# Patient Record
Sex: FEMALE | Race: WHITE | Employment: OTHER | URBAN - METROPOLITAN AREA
[De-identification: names, ages, dates, MRNs, and addresses within clinical notes are randomized per-mention and may not be internally consistent; named-entity substitution may affect disease eponyms.]

---

## 2017-03-21 RX ORDER — HYDROCHLOROTHIAZIDE 25 MG/1
TABLET ORAL
Qty: 90 TAB | Refills: 1 | Status: SHIPPED | OUTPATIENT
Start: 2017-03-21 | End: 2017-07-19 | Stop reason: SDUPTHER

## 2017-07-17 ENCOUNTER — OFFICE VISIT (OUTPATIENT)
Dept: INTERNAL MEDICINE CLINIC | Age: 67
End: 2017-07-17

## 2017-07-17 VITALS
OXYGEN SATURATION: 99 % | WEIGHT: 187.6 LBS | TEMPERATURE: 97.6 F | BODY MASS INDEX: 31.25 KG/M2 | DIASTOLIC BLOOD PRESSURE: 85 MMHG | RESPIRATION RATE: 16 BRPM | SYSTOLIC BLOOD PRESSURE: 135 MMHG | HEART RATE: 78 BPM | HEIGHT: 65 IN

## 2017-07-17 DIAGNOSIS — E03.9 ACQUIRED HYPOTHYROIDISM: ICD-10-CM

## 2017-07-17 DIAGNOSIS — M17.12 PRIMARY OSTEOARTHRITIS OF LEFT KNEE: ICD-10-CM

## 2017-07-17 DIAGNOSIS — Z12.11 SCREEN FOR COLON CANCER: ICD-10-CM

## 2017-07-17 DIAGNOSIS — N32.81 OAB (OVERACTIVE BLADDER): ICD-10-CM

## 2017-07-17 DIAGNOSIS — Z00.00 ROUTINE GENERAL MEDICAL EXAMINATION AT A HEALTH CARE FACILITY: ICD-10-CM

## 2017-07-17 DIAGNOSIS — Z71.89 ADVANCE DIRECTIVE DISCUSSED WITH PATIENT: ICD-10-CM

## 2017-07-17 DIAGNOSIS — M54.31 RIGHT SIDED SCIATICA: ICD-10-CM

## 2017-07-17 DIAGNOSIS — Z13.39 SCREENING FOR ALCOHOLISM: ICD-10-CM

## 2017-07-17 DIAGNOSIS — Z78.0 POSTMENOPAUSAL: ICD-10-CM

## 2017-07-17 DIAGNOSIS — I10 ESSENTIAL HYPERTENSION: Primary | ICD-10-CM

## 2017-07-17 RX ORDER — DICLOFENAC SODIUM 75 MG/1
75 TABLET, DELAYED RELEASE ORAL 2 TIMES DAILY
Qty: 30 TAB | Refills: 1 | Status: SHIPPED | OUTPATIENT
Start: 2017-07-17 | End: 2018-01-08 | Stop reason: ALTCHOICE

## 2017-07-17 NOTE — PATIENT INSTRUCTIONS
Low Back Pain: Exercises  Your Care Instructions  Here are some examples of typical rehabilitation exercises for your condition. Start each exercise slowly. Ease off the exercise if you start to have pain. Your doctor or physical therapist will tell you when you can start these exercises and which ones will work best for you. How to do the exercises  Press-up    1. Lie on your stomach, supporting your body with your forearms. 2. Press your elbows down into the floor to raise your upper back. As you do this, relax your stomach muscles and allow your back to arch without using your back muscles. As your press up, do not let your hips or pelvis come off the floor. 3. Hold for 15 to 30 seconds, then relax. 4. Repeat 2 to 4 times. Alternate arm and leg (bird dog) exercise    Note: Do this exercise slowly. Try to keep your body straight at all times, and do not let one hip drop lower than the other. 1. Start on the floor, on your hands and knees. 2. Tighten your belly muscles. 3. Raise one leg off the floor, and hold it straight out behind you. Be careful not to let your hip drop down, because that will twist your trunk. 4. Hold for about 6 seconds, then lower your leg and switch to the other leg. 5. Repeat 8 to 12 times on each leg. 6. Over time, work up to holding for 10 to 30 seconds each time. 7. If you feel stable and secure with your leg raised, try raising the opposite arm straight out in front of you at the same time. Knee-to-chest exercise    1. Lie on your back with your knees bent and your feet flat on the floor. 2. Bring one knee to your chest, keeping the other foot flat on the floor (or keeping the other leg straight, whichever feels better on your lower back). 3. Keep your lower back pressed to the floor. Hold for at least 15 to 30 seconds. 4. Relax, and lower the knee to the starting position. 5. Repeat with the other leg. Repeat 2 to 4 times with each leg.   6. To get more stretch, put your other leg flat on the floor while pulling your knee to your chest.  Curl-ups    1. Lie on the floor on your back with your knees bent at a 90-degree angle. Your feet should be flat on the floor, about 12 inches from your buttocks. 2. Cross your arms over your chest. If this bothers your neck, try putting your hands behind your neck (not your head), with your elbows spread apart. 3. Slowly tighten your belly muscles and raise your shoulder blades off the floor. 4. Keep your head in line with your body, and do not press your chin to your chest.  5. Hold this position for 1 or 2 seconds, then slowly lower yourself back down to the floor. 6. Repeat 8 to 12 times. Pelvic tilt exercise    1. Lie on your back with your knees bent. 2. \"Brace\" your stomach. This means to tighten your muscles by pulling in and imagining your belly button moving toward your spine. You should feel like your back is pressing to the floor and your hips and pelvis are rocking back. 3. Hold for about 6 seconds while you breathe smoothly. 4. Repeat 8 to 12 times. Heel dig bridging    1. Lie on your back with both knees bent and your ankles bent so that only your heels are digging into the floor. Your knees should be bent about 90 degrees. 2. Then push your heels into the floor, squeeze your buttocks, and lift your hips off the floor until your shoulders, hips, and knees are all in a straight line. 3. Hold for about 6 seconds as you continue to breathe normally, and then slowly lower your hips back down to the floor and rest for up to 10 seconds. 4. Do 8 to 12 repetitions. Hamstring stretch in doorway    1. Lie on your back in a doorway, with one leg through the open door. 2. Slide your leg up the wall to straighten your knee. You should feel a gentle stretch down the back of your leg. 3. Hold the stretch for at least 15 to 30 seconds. Do not arch your back, point your toes, or bend either knee.  Keep one heel touching the floor and the other heel touching the wall. 4. Repeat with your other leg. 5. Do 2 to 4 times for each leg. Hip flexor stretch    1. Kneel on the floor with one knee bent and one leg behind you. Place your forward knee over your foot. Keep your other knee touching the floor. 2. Slowly push your hips forward until you feel a stretch in the upper thigh of your rear leg. 3. Hold the stretch for at least 15 to 30 seconds. Repeat with your other leg. 4. Do 2 to 4 times on each side. Wall sit    1. Stand with your back 10 to 12 inches away from a wall. 2. Lean into the wall until your back is flat against it. 3. Slowly slide down until your knees are slightly bent, pressing your lower back into the wall. 4. Hold for about 6 seconds, then slide back up the wall. 5. Repeat 8 to 12 times. Follow-up care is a key part of your treatment and safety. Be sure to make and go to all appointments, and call your doctor if you are having problems. It's also a good idea to know your test results and keep a list of the medicines you take. Where can you learn more? Go to http://[employer]ztof.info/. Enter Q768 in the search box to learn more about \"Low Back Pain: Exercises. \"  Current as of: March 21, 2017  Content Version: 11.3  © 2618-9760 [employer]. Care instructions adapted under license by [employer] (which disclaims liability or warranty for this information). If you have questions about a medical condition or this instruction, always ask your healthcare professional. [place] [address] any warranty or liability for your use of this information. Knee Arthritis: Exercises  Your Care Instructions  Here are some examples of exercises for knee arthritis. Start each exercise slowly. Ease off the exercise if you start to have pain. Your doctor or physical therapist will tell you when you can start these exercises and which ones will work best for you.   How to do the exercises  Knee flexion with heel slide    5. Lie on your back with your knees bent. 6. Slide your heel back by bending your affected knee as far as you can. Then hook your other foot around your ankle to help pull your heel even farther back. 7. Hold for about 6 seconds, then rest for up to 10 seconds. 8. Repeat 8 to 12 times. 9. Switch legs and repeat steps 1 through 4, even if only one knee is sore. Quad sets    8. Sit with your affected leg straight and supported on the floor or a firm bed. Place a small, rolled-up towel under your knee. Your other leg should be bent, with that foot flat on the floor. 9. Tighten the thigh muscles of your affected leg by pressing the back of your knee down into the towel. 10. Hold for about 6 seconds, then rest for up to 10 seconds. 11. Repeat 8 to 12 times. 12. Switch legs and repeat steps 1 through 4, even if only one knee is sore. Straight-leg raises to the front    7. Lie on your back with your good knee bent so that your foot rests flat on the floor. Your affected leg should be straight. Make sure that your low back has a normal curve. You should be able to slip your hand in between the floor and the small of your back, with your palm touching the floor and your back touching the back of your hand. 8. Tighten the thigh muscles in your affected leg by pressing the back of your knee flat down to the floor. Hold your knee straight. 9. Keeping the thigh muscles tight and your leg straight, lift your affected leg up so that your heel is about 12 inches off the floor. Hold for about 6 seconds, then lower slowly. 10. Relax for up to 10 seconds between repetitions. 11. Repeat 8 to 12 times. 12. Switch legs and repeat steps 1 through 5, even if only one knee is sore. Active knee flexion    7. Lie on your stomach with your knees straight. If your kneecap is uncomfortable, roll up a washcloth and put it under your leg just above your kneecap.   8. Lift the foot of your affected leg by bending the knee so that you bring the foot up toward your buttock. If this motion hurts, try it without bending your knee quite as far. This may help you avoid any painful motion. 9. Slowly move your leg up and down. 10. Repeat 8 to 12 times. 11. Switch legs and repeat steps 1 through 4, even if only one knee is sore. Quadriceps stretch (facedown)    5. Lie flat on your stomach, and rest your face on the floor. 6. Wrap a towel or belt strap around the lower part of your affected leg. Then use the towel or belt strap to slowly pull your heel toward your buttock until you feel a stretch. 7. Hold for about 15 to 30 seconds, then relax your leg against the towel or belt strap. 8. Repeat 2 to 4 times. 9. Switch legs and repeat steps 1 through 4, even if only one knee is sore. Stationary exercise bike    If you do not have a stationary exercise bike at home, you can find one to ride at your local health club or community center. 5. Adjust the height of the bike seat so that your knee is slightly bent when your leg is extended downward. If your knee hurts when the pedal reaches the top, you can raise the seat so that your knee does not bend as much. 6. Start slowly. At first, try to do 5 to 10 minutes of cycling with little to no resistance. Then increase your time and the resistance bit by bit until you can do 20 to 30 minutes without pain. 7. If you start to have pain, rest your knee until your pain gets back to the level that is normal for you. Or cycle for less time or with less effort. Follow-up care is a key part of your treatment and safety. Be sure to make and go to all appointments, and call your doctor if you are having problems. It's also a good idea to know your test results and keep a list of the medicines you take. Where can you learn more? Go to http://deyan[employer]-krzysztof.info/.   Enter C159 in the search box to learn more about \"Knee Arthritis: Exercises. \"  Current as of: March 21, 2017  Content Version: 11.3  © 8225-2551 [employer]. Care instructions adapted under license by [employer] (which disclaims liability or warranty for this information). If you have questions about a medical condition or this instruction, always ask your healthcare professional. [address] any warranty or liability for your use of this information. Medicare Wellness Visit, Female    The best way to live healthy is to have a healthy lifestyle by eating a well-balanced diet, exercising regularly, limiting alcohol and stopping smoking. Regular physical exams and screening tests are another way to keep healthy. Preventive exams provided by your health care provider can find health problems before they become diseases or illnesses. Preventive services including immunizations, screening tests, monitoring and exams can help you take care of your own health. All people over age 72 should have a pneumovax  and and a prevnar shot to prevent pneumonia. These are once in a lifetime unless you and your provider decide differently. All people over 65 should have a yearly flu shot and a tetanus vaccine every 10 years. A bone mass density to screen for osteoporosis or thinning of the bones should be done every 2 years after 65. Screening for diabetes mellitus with a blood sugar test should be done every year. Glaucoma is a disease of the eye due to increased ocular pressure that can lead to blindness and it should be done every year by an eye professional.    Cardiovascular screening tests that check for elevated lipids (fatty part of blood) which can lead to heart disease and strokes should be done every 5 years.     Colorectal screening that evaluates for blood or polyps in your colon should be done yearly as a stool test or every five years as a flexible sigmoidoscope or every 10 years as a colonoscopy up to age 76.    Breast cancer screening with a mammogram is recommended biennially  for women age 54-69. Screening for cervical cancer with a pap smear and pelvic exam is recommended for women after age 72 years every 2 years up to age 79 or when the provider and patient decide to stop. If there is a history of cervical abnormalities or other increased risk for cancer then the test is recommended yearly. Hepatitis C screening is also recommended for anyone born between [birth year] through [address]. A shingles vaccine is also recommended once in a lifetime after age 61. Your Medicare Wellness Exam is recommended annually.     Here is a list of your current Health Maintenance items with a due date:  Health Maintenance Due   Topic Date Due    Colonoscopy  Referral given - please schedule    DTaP/Tdap/Td  (1 - Tdap) Prescription given    Glaucoma Screening   Patient will schedule    Bone Density Screening  Ordered today    Annual Well Visit  07/17/2018    Pneumococcal Vaccine (2 of 2 - PCV13) Up to date    Breast Cancer Screening  08/16/2017

## 2017-07-17 NOTE — PROGRESS NOTES
Shira Ramos is a 75 y.o. female who presents today with discomfort in legs especially at night. She also complains of nausea and feeling \"not so great\" when walking around for example the grocery store. She mentions she has trouble with urination sometimes causing incontinence. Shira Ramos states she is having trouble with back and left knee as well. patient main concern is getting up and down on chairs she states it is exteremly hard to get up. 1. Have you been to the ER, urgent care clinic since your last visit? Hospitalized since your last visit? No    2. Have you seen or consulted any other health care providers outside of the [address] [place] [address] since your last visit? Include any pap smears or colon screening.  No

## 2017-07-17 NOTE — MR AVS SNAPSHOT
Visit Information Date & Time Provider Department Dept. Phone Encounter #  
 7/17/2017 12:40 PM Elena Anna MD [facility] [phone] [id number] Upcoming Health Maintenance Date Due COLONOSCOPY 5/4/1967 DTaP/Tdap/Td series (1 - Tdap) 5/4/1970 GLAUCOMA SCREENING Q2Y 5/4/2014 OSTEOPOROSIS SCREENING (DEXA) 5/4/2014 MEDICARE YEARLY EXAM 5/4/2014 Pneumococcal 65+ Low/Medium Risk (2 of 2 - PCV13) 11/12/2015 BREAST CANCER SCRN MAMMOGRAM 8/16/2017 INFLUENZA AGE 9 TO ADULT 8/1/2017 Allergies as of 7/17/2017  Review Complete On: 7/17/2017 By: Elena Anna MD  
  
 Severity Noted Reaction Type Reactions Latex  10/24/2011    Unknown (comments) Augmentin [Amoxicillin-pot Clavulanate]  01/07/2013    Diarrhea She can tolerate Amoxicillin Erythromycin  10/10/2011    Unknown (comments) Motrin [Ibuprofen]  10/10/2011    Swelling  
 legs Current Immunizations  Reviewed on 8/4/2015 Name Date Influenza Vaccine 11/12/2014, 10/10/2013 Influenza Vaccine Split 10/24/2011 11:52 AM  
 Pneumococcal Conjugate (PCV-13) 6/1/2016 Pneumococcal Polysaccharide (PPSV-23) 11/12/2014 Zoster Vaccine, Live 8/3/2015 Not reviewed this visit You Were Diagnosed With   
  
 Codes Comments Essential hypertension    -  Primary ICD-10-CM: I10 
ICD-9-CM: 401.9 Routine general medical examination at a health care facility     ICD-10-CM: Z00.00 ICD-9-CM: V70.0 Screening for alcoholism     ICD-10-CM: Z13.89 ICD-9-CM: V79.1 Postmenopausal     ICD-10-CM: Z78.0 ICD-9-CM: V49.81 Screen for colon cancer     ICD-10-CM: Z12.11 ICD-9-CM: V76.51   
 OAB (overactive bladder)     ICD-10-CM: N32.81 ICD-9-CM: 596.51 Acquired hypothyroidism     ICD-10-CM: E03.9 ICD-9-CM: 244.9 Primary osteoarthritis of left knee     ICD-10-CM: M17.12 
ICD-9-CM: 715.16 Right sided sciatica     ICD-10-CM: M54.31 
ICD-9-CM: 724.3 Vitals BP Pulse Temp Resp Height(growth percentile) Weight(growth percentile) 135/85 78 97.6 °F (36.4 °C) 16 5' 5\" (1.651 m) 187 lb 9.6 oz (85.1 kg) SpO2 BMI OB Status Smoking Status 99% 31.22 kg/m2 Postmenopausal Never Smoker Vitals History BMI and BSA Data Body Mass Index Body Surface Area  
 31.22 kg/m 2 1.98 m 2 Preferred Pharmacy Pharmacy Name Phone [address] [facility] [phone] Your Updated Medication List  
  
   
This list is accurate as of: 17  1:43 PM.  Always use your most recent med list.  
  
  
  
  
 diclofenac EC 75 mg EC tablet Commonly known as:  VOLTAREN Take 1 Tab by mouth two (2) times a day. diph,pertuss(acel),tetanus vac(PF) 2 Lf-(2.5-5-3-5 mcg)-5Lf/0.5 mL Syrg vaccine Commonly known as:  ADACEL  
0.5 mL by IntraMUSCular route once for 1 dose.  
  
 hydroCHLOROthiazide 25 mg tablet Commonly known as:  HYDRODIURIL  
TAKE 1 TABLET DAILY  
  
 SYNTHROID 100 mcg tablet Generic drug:  levothyroxine TAKE ONE TABLET DAILY BEFORE BREAKFAST AND ONE AND ONE-HALF TABLETS ON  Prescriptions Printed Refills diph,pertuss,acel,,tetanus vac,PF, (ADACEL) 2 Lf-(2.5-5-3-5 mcg)-5Lf/0.5 mL syrg vaccine 0 Si.5 mL by IntraMUSCular route once for 1 dose. Class: Print Route: IntraMUSCular Prescriptions Sent to Pharmacy Refills  
 diclofenac EC (VOLTAREN) 75 mg EC tablet 1 Sig: Take 1 Tab by mouth two (2) times a day. Class: Normal  
 Pharmacy: [address], [address] [place] [address] #: [phone] Route: Oral  
  
We Performed the Following METABOLIC PANEL, BASIC [54084 CPT(R)] REFERRAL FOR COLONOSCOPY [NTT151 Custom] T4, FREE [id number] CPT(R)] TSH 3RD GENERATION [15996 CPT(R)] To-Do List   
 2017 Imaging:  DEXA BONE DENSITY STUDY AXIAL Referral Information Referral ID Referred By Referred To  
  
 [id number] Cliff Ratliff MD   
   [address] [place], [address] [facility] Phone: [phone] Fax: [phone] Visits Status Start Date End Date 1 New Request 7/17/17 7/17/18 If your referral has a status of pending review or denied, additional information will be sent to support the outcome of this decision. Patient Instructions Low Back Pain: Exercises Your Care Instructions Here are some examples of typical rehabilitation exercises for your condition. Start each exercise slowly. Ease off the exercise if you start to have pain. Your doctor or physical therapist will tell you when you can start these exercises and which ones will work best for you. How to do the exercises Press-up 1. Lie on your stomach, supporting your body with your forearms. 2. Press your elbows down into the floor to raise your upper back. As you do this, relax your stomach muscles and allow your back to arch without using your back muscles. As your press up, do not let your hips or pelvis come off the floor. 3. Hold for 15 to 30 seconds, then relax. 4. Repeat 2 to 4 times. Alternate arm and leg (bird dog) exercise Note: Do this exercise slowly. Try to keep your body straight at all times, and do not let one hip drop lower than the other. 1. Start on the floor, on your hands and knees. 2. Tighten your belly muscles. 3. Raise one leg off the floor, and hold it straight out behind you. Be careful not to let your hip drop down, because that will twist your trunk. 4. Hold for about 6 seconds, then lower your leg and switch to the other leg. 5. Repeat 8 to 12 times on each leg. 6. Over time, work up to holding for 10 to 30 seconds each time. 7. If you feel stable and secure with your leg raised, try raising the opposite arm straight out in front of you at the same time. Knee-to-chest exercise 1. Lie on your back with your knees bent and your feet flat on the floor. 2. Bring one knee to your chest, keeping the other foot flat on the floor (or keeping the other leg straight, whichever feels better on your lower back). 3. Keep your lower back pressed to the floor. Hold for at least 15 to 30 seconds. 4. Relax, and lower the knee to the starting position. 5. Repeat with the other leg. Repeat 2 to 4 times with each leg. 6. To get more stretch, put your other leg flat on the floor while pulling your knee to your chest. 
Curl-ups 1. Lie on the floor on your back with your knees bent at a 90-degree angle. Your feet should be flat on the floor, about 12 inches from your buttocks. 2. Cross your arms over your chest. If this bothers your neck, try putting your hands behind your neck (not your head), with your elbows spread apart. 3. Slowly tighten your belly muscles and raise your shoulder blades off the floor. 4. Keep your head in line with your body, and do not press your chin to your chest. 
5. Hold this position for 1 or 2 seconds, then slowly lower yourself back down to the floor. 6. Repeat 8 to 12 times. Pelvic tilt exercise 1. Lie on your back with your knees bent. 2. \"Brace\" your stomach. This means to tighten your muscles by pulling in and imagining your belly button moving toward your spine. You should feel like your back is pressing to the floor and your hips and pelvis are rocking back. 3. Hold for about 6 seconds while you breathe smoothly. 4. Repeat 8 to 12 times. Heel dig bridging 1. Lie on your back with both knees bent and your ankles bent so that only your heels are digging into the floor. Your knees should be bent about 90 degrees. 2. Then push your heels into the floor, squeeze your buttocks, and lift your hips off the floor until your shoulders, hips, and knees are all in a straight line. 3. Hold for about 6 seconds as you continue to breathe normally, and then slowly lower your hips back down to the floor and rest for up to 10 seconds. 4. Do 8 to 12 repetitions. Hamstring stretch in doorway 1. Lie on your back in a doorway, with one leg through the open door. 2. Slide your leg up the wall to straighten your knee. You should feel a gentle stretch down the back of your leg. 3. Hold the stretch for at least 15 to 30 seconds. Do not arch your back, point your toes, or bend either knee. Keep one heel touching the floor and the other heel touching the wall. 4. Repeat with your other leg. 5. Do 2 to 4 times for each leg. Hip flexor stretch 1. Kneel on the floor with one knee bent and one leg behind you. Place your forward knee over your foot. Keep your other knee touching the floor. 2. Slowly push your hips forward until you feel a stretch in the upper thigh of your rear leg. 3. Hold the stretch for at least 15 to 30 seconds. Repeat with your other leg. 4. Do 2 to 4 times on each side. Wall sit 1. Stand with your back 10 to 12 inches away from a wall. 2. Lean into the wall until your back is flat against it. 3. Slowly slide down until your knees are slightly bent, pressing your lower back into the wall. 4. Hold for about 6 seconds, then slide back up the wall. 5. Repeat 8 to 12 times. Follow-up care is a key part of your treatment and safety. Be sure to make and go to all appointments, and call your doctor if you are having problems. It's also a good idea to know your test results and keep a list of the medicines you take. Where can you learn more? Go to http://deyanira-krzysztof.info/. Enter Y355 in the search box to learn more about \"Low Back Pain: Exercises. \" Current as of: March 21, 2017 Content Version: 11.3 © 6285-0355 [employer].  Care instructions adapted under license by [employer] (which disclaims liability or warranty for this information). If you have questions about a medical condition or this instruction, always ask your healthcare professional. [address] any warranty or liability for your use of this information. Knee Arthritis: Exercises Your Care Instructions Here are some examples of exercises for knee arthritis. Start each exercise slowly. Ease off the exercise if you start to have pain. Your doctor or physical therapist will tell you when you can start these exercises and which ones will work best for you. How to do the exercises Knee flexion with heel slide 5. Lie on your back with your knees bent. 6. Slide your heel back by bending your affected knee as far as you can. Then hook your other foot around your ankle to help pull your heel even farther back. 7. Hold for about 6 seconds, then rest for up to 10 seconds. 8. Repeat 8 to 12 times. 9. Switch legs and repeat steps 1 through 4, even if only one knee is sore. [facility] 8. Sit with your affected leg straight and supported on the floor or a firm bed. Place a small, rolled-up towel under your knee. Your other leg should be bent, with that foot flat on the floor. 9. Tighten the thigh muscles of your affected leg by pressing the back of your knee down into the towel. 10. Hold for about 6 seconds, then rest for up to 10 seconds. 11. Repeat 8 to 12 times. 12. Switch legs and repeat steps 1 through 4, even if only one knee is sore. Straight-leg raises to the front 7. Lie on your back with your good knee bent so that your foot rests flat on the floor. Your affected leg should be straight. Make sure that your low back has a normal curve. You should be able to slip your hand in between the floor and the small of your back, with your palm touching the floor and your back touching the back of your hand.  
8. Tighten the thigh muscles in your affected leg by pressing the back of your knee flat down to the floor. Hold your knee straight. 9. Keeping the thigh muscles tight and your leg straight, lift your affected leg up so that your heel is about 12 inches off the floor. Hold for about 6 seconds, then lower slowly. 10. Relax for up to 10 seconds between repetitions. 11. Repeat 8 to 12 times. 12. Switch legs and repeat steps 1 through 5, even if only one knee is sore. Active knee flexion 7. Lie on your stomach with your knees straight. If your kneecap is uncomfortable, roll up a washcloth and put it under your leg just above your kneecap. 8. Lift the foot of your affected leg by bending the knee so that you bring the foot up toward your buttock. If this motion hurts, try it without bending your knee quite as far. This may help you avoid any painful motion. 9. Slowly move your leg up and down. 10. Repeat 8 to 12 times. 11. Switch legs and repeat steps 1 through 4, even if only one knee is sore. Quadriceps stretch (facedown) 5. Lie flat on your stomach, and rest your face on the floor. 6. Wrap a towel or belt strap around the lower part of your affected leg. Then use the towel or belt strap to slowly pull your heel toward your buttock until you feel a stretch. 7. Hold for about 15 to 30 seconds, then relax your leg against the towel or belt strap. 8. Repeat 2 to 4 times. 9. Switch legs and repeat steps 1 through 4, even if only one knee is sore. Stationary exercise bike If you do not have a stationary exercise bike at home, you can find one to ride at your local health club or community center. 5. Adjust the height of the bike seat so that your knee is slightly bent when your leg is extended downward. If your knee hurts when the pedal reaches the top, you can raise the seat so that your knee does not bend as much. 6. Start slowly. At first, try to do 5 to 10 minutes of cycling with little to no resistance.  Then increase your time and the resistance bit by bit until you can do 20 to 30 minutes without pain. 7. If you start to have pain, rest your knee until your pain gets back to the level that is normal for you. Or cycle for less time or with less effort. Follow-up care is a key part of your treatment and safety. Be sure to make and go to all appointments, and call your doctor if you are having problems. It's also a good idea to know your test results and keep a list of the medicines you take. Where can you learn more? Go to http://deyanira-krzysztof.info/. Enter C159 in the search box to learn more about \"Knee Arthritis: Exercises. \" Current as of: March 21, 2017 Content Version: 11.3 © 7287-7495 [employer]. Care instructions adapted under license by [employer] (which disclaims liability or warranty for this information). If you have questions about a medical condition or this instruction, always ask your healthcare professional. [place] [address] any warranty or liability for your use of this information. Medicare Wellness Visit, Female The best way to live healthy is to have a healthy lifestyle by eating a well-balanced diet, exercising regularly, limiting alcohol and stopping smoking. Regular physical exams and screening tests are another way to keep healthy. Preventive exams provided by your health care provider can find health problems before they become diseases or illnesses. Preventive services including immunizations, screening tests, monitoring and exams can help you take care of your own health. All people over age 72 should have a pneumovax  and and a prevnar shot to prevent pneumonia. These are once in a lifetime unless you and your provider decide differently. All people over 65 should have a yearly flu shot and a tetanus vaccine every 10 years. A bone mass density to screen for osteoporosis or thinning of the bones should be done every 2 years after 65. Screening for diabetes mellitus with a blood sugar test should be done every year. Glaucoma is a disease of the eye due to increased ocular pressure that can lead to blindness and it should be done every year by an eye professional. 
 
Cardiovascular screening tests that check for elevated lipids (fatty part of blood) which can lead to heart disease and strokes should be done every 5 years. Colorectal screening that evaluates for blood or polyps in your colon should be done yearly as a stool test or every five years as a flexible sigmoidoscope or every 10 years as a colonoscopy up to age 76. Breast cancer screening with a mammogram is recommended biennially  for women age 54-69. Screening for cervical cancer with a pap smear and pelvic exam is recommended for women after age 72 years every 2 years up to age 79 or when the provider and patient decide to stop. If there is a history of cervical abnormalities or other increased risk for cancer then the test is recommended yearly. Hepatitis C screening is also recommended for anyone born between [birth year] through [address]. A shingles vaccine is also recommended once in a lifetime after age 61. Your Medicare Wellness Exam is recommended annually. Here is a list of your current Health Maintenance items with a due date: 
Health Maintenance Due Topic Date Due  
 Colonoscopy  Referral given - please schedule  DTaP/Tdap/Td  (1 - Tdap) Prescription given  Glaucoma Screening   Patient will schedule  Bone Density Screening  Ordered today [facility] Annual Well Visit  07/17/2018  Pneumococcal Vaccine (2 of 2 - PCV13) Up to date  Breast Cancer Screening  08/16/2017 Introducing [facility]! [employer] introduces GoPlaceIt patient portal. Now you can access parts of your medical record, email your doctor's office, and request medication refills online. 1. In your internet browser, go to [email or web address]. [email or web address][email or web address] 2. Click on the First Time User? Click Here link in the Sign In box. You will see the New Member Sign Up page. 3. Enter your Infochimps Access Code exactly as it appears below. You will not need to use this code after youve completed the sign-up process. If you do not sign up before the expiration date, you must request a new code. · Infochimps Access Code: [email or web address] Expires: 10/15/2017  1:27 PM 
 
4. Enter the last four digits of your Social Security Number (xxxx) and Date of Birth (mm/dd/yyyy) as indicated and click Submit. You will be taken to the next sign-up page. 5. Create a Infochimps ID. This will be your Infochimps login ID and cannot be changed, so think of one that is secure and easy to remember. 6. Create a Infochimps password. You can change your password at any time. 7. Enter your Password Reset Question and Answer. This can be used at a later time if you forget your password. 8. Enter your e-mail address. You will receive e-mail notification when new information is available in [address] 9. Click Sign Up. You can now view and download portions of your medical record. 10. Click the Download Summary menu link to download a portable copy of your medical information. If you have questions, please visit the Frequently Asked Questions section of the Infochimps website. Remember, Infochimps is NOT to be used for urgent needs. For medical emergencies, dial 911. Now available from your iPhone and Android! Please provide this summary of care documentation to your next provider. Your primary care clinician is listed as [facility]. If you have any questions after today's visit, please call [phone].

## 2017-07-17 NOTE — PROGRESS NOTES
Subjective:     Willistine Cowden is a 75 y.o. female who presents for follow up of hypertension and hypothyroidism. .    Diet and Lifestyle: not attempting to follow a low fat, low cholesterol diet, not attempting to follow a low sodium diet, sedentary  Home BP Monitoring: is not measured at home    Cardiovascular ROS: taking medications as instructed, no medication side effects noted, no TIA's, no chest pain on exertion, no dyspnea on exertion, noting swelling of ankles, no orthostatic dizziness or lightheadedness, no palpitations. New concerns:   Increasing difficulty getting out of chair over the last 6-12 months,. R TKR 2011. Left knee with pain, crunching and swelling. Not unstable. No recent falls. Having pain in right sciatic nerve. Worse at night but improved once walks around - dull ache right worse than left leg. No stretching, no exercise. Increased urination at night - .3 times and increased urgency during the day. Some nausea but blames on stress.  is out of work. Sleeping ok if legs are not causing pain. Hypothyroid - energy level is low. Denies changes in weight, skin or hair. Increase in constipation.    + POP - cannot wear mask. Has not looked into getting a dental appliance. Currently not treated. Current Outpatient Prescriptions   Medication Sig Dispense Refill    diclofenac EC (VOLTAREN) 75 mg EC tablet Take 1 Tab by mouth two (2) times a day. 30 Tab 1    diph,pertuss,acel,,tetanus vac,PF, (ADACEL) 2 Lf-(2.5-5-3-5 mcg)-5Lf/0.5 mL syrg vaccine 0.5 mL by IntraMUSCular route once for 1 dose.  0.5 mL 0    hydroCHLOROthiazide (HYDRODIURIL) 25 mg tablet TAKE 1 TABLET DAILY 90 Tab 1    SYNTHROID 100 mcg tablet TAKE ONE TABLET DAILY BEFORE BREAKFAST AND ONE AND ONE-HALF TABLETS ON SUNDAY 105 Tab 3        Lab Results   Component Value Date/Time    Cholesterol, total 202 06/06/2016 12:11 PM    HDL Cholesterol 58 06/06/2016 12:11 PM    LDL, calculated 117 06/06/2016 12:11 PM    Triglyceride 135 06/06/2016 12:11 PM     Lab Results   Component Value Date/Time    ALT (SGPT) 11 06/06/2016 12:11 PM    AST (SGOT) 14 06/06/2016 12:11 PM    Alk. phosphatase 84 06/06/2016 12:11 PM    Bilirubin, total 0.9 06/06/2016 12:11 PM    Albumin 4.3 06/06/2016 12:11 PM    Protein, total 7.1 06/06/2016 12:11 PM    PLATELET 075 73/73/5853 10:00 AM       Lab Results   Component Value Date/Time    GFR est non-AA 62 06/06/2016 12:11 PM    GFR est AA 72 06/06/2016 12:11 PM    Creatinine 0.95 06/06/2016 12:11 PM    BUN 20 06/06/2016 12:11 PM    Sodium 141 06/06/2016 12:11 PM    Potassium 4.1 06/06/2016 12:11 PM    Chloride 100 06/06/2016 12:11 PM    CO2 26 06/06/2016 12:11 PM     Lab Results   Component Value Date/Time    TSH 3.830 06/06/2016 12:11 PM    T4, Free 1.51 06/06/2016 12:11 PM        Review of Systems, additional:  Pertinent items are noted in HPI. Objective:   Visit Vitals    /85    Pulse 78    Temp 97.6 °F (36.4 °C)    Resp 16    Ht 5' 5\" (1.651 m)    Wt 187 lb 9.6 oz (85.1 kg)    SpO2 99%    BMI 31.22 kg/m2     Appearance: alert, well appearing, and in no distress and oriented to person, place, and time. General exam:   NECK: supple, no lad, no bruit, no tm  LUNGS: cta bilat  CV rrr, no m/g/r  ABD: soft, nt, nd, nabs  EXT: no c/c/e  BACK - tenderness bilat (right worse than left) paraspinal muscles, lspine nontender, buttocks nontender. SLR + on right. Left knee - + crepitance and decreased rom. No swelling. ..     Assessment/Plan:     hypertension well controlled. current treatment plan is effective, no change in therapy.    Check labs    Hypothyroid - clinically euthyroid  Check labs  Cont same    Low back pain with sicatica - stretching exercises  Nsailds  If no improvement consider PT    OAB - discussed lifestyle changes  May consider medications in the future    OA left knee - strengthening exercises  May need referral to ortho in juture    Orders Placed This Encounter    Dexa Bone Density Study Axial ([id number])    METABOLIC PANEL, BASIC    T4, FREE    TSH 3RD GENERATION    Referral for Colonoscopy (options for GI, Colon &  Rectal Surgery, & General Surgery)    diclofenac EC (VOLTAREN) 75 mg EC tablet    diph,pertuss,acel,,tetanus vac,PF, (ADACEL) 2 Lf-(2.5-5-3-5 mcg)-5Lf/0.5 mL syrg vaccine     Follow-up Disposition: Not on File       This is an Initial Medicare Annual Wellness Exam (AWV) (Performed 12 months after IPPE or effective date of Medicare Part B enrollment, Once in a lifetime)    I have reviewed the patient's medical history in detail and updated the computerized patient record. History     Past Medical History:   Diagnosis Date    Fibrocystic breast disease     Hypertension     Hypothyroid 2/1998    s/p I131 treatment - [place]    IBS (irritable bowel syndrome)     POP on CPAP     4mm    Pilonidal cyst 1968      Past Surgical History:   Procedure Laterality Date    BREAST SURGERY PROCEDURE UNLISTED  10/09    benign lumpectomy - left    HX APPENDECTOMY  1960    HX DILATION AND CURETTAGE  11/00    HX GYN  5240,2193    exp laparoscopy for endometriosis    HX KNEE REPLACEMENT  9/10/10    right    HX ORTHOPAEDIC      r knee arthroscopy    HX ORTHOPAEDIC  12/01    left thumb and forearm surg     Current Outpatient Prescriptions   Medication Sig Dispense Refill    diclofenac EC (VOLTAREN) 75 mg EC tablet Take 1 Tab by mouth two (2) times a day. 30 Tab 1    diph,pertuss,acel,,tetanus vac,PF, (ADACEL) 2 Lf-(2.5-5-3-5 mcg)-5Lf/0.5 mL syrg vaccine 0.5 mL by IntraMUSCular route once for 1 dose.  0.5 mL 0    hydroCHLOROthiazide (HYDRODIURIL) 25 mg tablet TAKE 1 TABLET DAILY 90 Tab 1    SYNTHROID 100 mcg tablet TAKE ONE TABLET DAILY BEFORE BREAKFAST AND ONE AND ONE-HALF TABLETS ON SUNDAY 105 Tab 3     Allergies   Allergen Reactions    Latex Unknown (comments)    Augmentin [Amoxicillin-Pot Clavulanate] Diarrhea     She can tolerate Amoxicillin  Erythromycin Unknown (comments)    Motrin [Ibuprofen] Swelling     legs     Family History   Problem Relation Age of Onset    Hypertension Mother     Heart Attack Mother 80    COPD Father     Dementia Father      alzeihmers    Other Father      PUD     Social History   Substance Use Topics    Smoking status: Never Smoker    Smokeless tobacco: Never Used    Alcohol use No     Patient Active Problem List   Diagnosis Code    IBS (irritable bowel syndrome) K58.9    Fibrocystic breast N60.19    HTN (hypertension) I10    Hypothyroid E03.9    Obesity (BMI 30.0-34. 9) E66.9    OAB (overactive bladder) N32.81         Depression Risk Factor Screening:     PHQ over the last two weeks 7/17/2017   Little interest or pleasure in doing things Not at all   Feeling down, depressed or hopeless Not at all   Total Score PHQ 2 0     Alcohol Risk Factor Screening: On any occasion during the past 3 months, have you had more than 3 drinks containing alcohol? No    Do you average more than 7 drinks per week? No      Functional Ability and Level of Safety:     Hearing Loss   mild    Activities of Daily Living   Self-care. Requires assistance with: no ADLs    Fall Risk   Fall Risk Assessment, last 12 mths 7/17/2017   Able to walk? Yes   Fall in past 12 months? No   Fall with injury? -   Number of falls in past 12 months -   Fall Risk Score -     Abuse Screen   Patient is not abused    Review of Systems   Pertinent items are noted in HPI. Physical Examination     No exam data present    Evaluation of Cognitive Function:  Mood/affect:  neutral  Appearance: age appropriate  Family member/caregiver input: none    See above    Patient Care Team:  Dorita Lo MD as PCP - General (Internal Medicine)    Advice/Referrals/Counseling   Education and counseling provided:  Advanced directive discussed with pt.   Written materials provided      Assessment/Plan   HM-   Colonoscopy  DXA  rx for tdap  Advanced directives discussed. Orders Placed This Encounter    Dexa Bone Density Study Axial ([id number])    METABOLIC PANEL, BASIC    T4, FREE    TSH 3RD GENERATION    Referral for Colonoscopy (options for GI, Colon &  Rectal Surgery, & General Surgery)    diclofenac EC (VOLTAREN) 75 mg EC tablet    diph,pertuss,acel,,tetanus vac,PF, (ADACEL) 2 Lf-(2.5-5-3-5 mcg)-5Lf/0.5 mL syrg vaccine     Follow-up Disposition: Not on File.

## 2017-07-18 PROBLEM — Z71.89 ADVANCE DIRECTIVE DISCUSSED WITH PATIENT: Status: ACTIVE | Noted: 2017-07-18

## 2017-07-25 LAB
BUN SERPL-MCNC: 23 MG/DL (ref 8–27)
BUN/CREAT SERPL: 21 (ref 12–28)
CALCIUM SERPL-MCNC: 10 MG/DL (ref 8.7–10.3)
CHLORIDE SERPL-SCNC: 99 MMOL/L (ref 96–106)
CO2 SERPL-SCNC: 25 MMOL/L (ref 18–29)
CREAT SERPL-MCNC: 1.08 MG/DL (ref 0.57–1)
GLUCOSE SERPL-MCNC: 84 MG/DL (ref 65–99)
INTERPRETATION: NORMAL
POTASSIUM SERPL-SCNC: 3.9 MMOL/L (ref 3.5–5.2)
SODIUM SERPL-SCNC: 139 MMOL/L (ref 134–144)
T4 FREE SERPL-MCNC: 1.63 NG/DL (ref 0.82–1.77)
TSH SERPL DL<=0.005 MIU/L-ACNC: 3.86 UIU/ML (ref 0.45–4.5)

## 2017-10-16 ENCOUNTER — TELEPHONE (OUTPATIENT)
Dept: INTERNAL MEDICINE CLINIC | Age: 67
End: 2017-10-16

## 2017-10-16 NOTE — TELEPHONE ENCOUNTER
[facility],and the doctors told her from the way she is walking that she probley has parkinsons desease. Requesting return call.

## 2017-10-16 NOTE — TELEPHONE ENCOUNTER
Discussed. Walking is difficult secondary to OA and sciatica. Tremor is worse. Referred to neurology. Also told by [facility] that she had a basal cell left lower eye lid - referred to opthalmology.

## 2017-10-16 NOTE — TELEPHONE ENCOUNTER
Pt wants to discuss [facility] notes with MD.Please give pt a call back at this number [phone]

## 2017-10-20 ENCOUNTER — DOCUMENTATION ONLY (OUTPATIENT)
Dept: INTERNAL MEDICINE CLINIC | Age: 67
End: 2017-10-20

## 2017-10-20 DIAGNOSIS — Z78.0 POSTMENOPAUSAL: ICD-10-CM

## 2018-01-08 ENCOUNTER — OFFICE VISIT (OUTPATIENT)
Dept: INTERNAL MEDICINE CLINIC | Age: 68
End: 2018-01-08

## 2018-01-08 VITALS
HEART RATE: 75 BPM | TEMPERATURE: 97.8 F | RESPIRATION RATE: 18 BRPM | DIASTOLIC BLOOD PRESSURE: 78 MMHG | HEIGHT: 65 IN | WEIGHT: 181.4 LBS | OXYGEN SATURATION: 98 % | SYSTOLIC BLOOD PRESSURE: 132 MMHG | BODY MASS INDEX: 30.22 KG/M2

## 2018-01-08 DIAGNOSIS — C44.319 BASAL CELL CARCINOMA OF SKIN OF OTHER PART OF FACE: Primary | ICD-10-CM

## 2018-01-08 DIAGNOSIS — Z01.818 PRE-OP EXAM: ICD-10-CM

## 2018-01-08 NOTE — PROGRESS NOTES
Preoperative Evaluation    Date of Exam: 2018    Jovani Duenas is a 75 y.o. female (:1950) who presents for preoperative evaluation. Right lower lid basal cell carcinoma. Mohs surgery with Dr. Sagar Alvarenga on  and reconstructive surgery on  with Dr. Janessa Jackson: yes    Problem List:     Patient Active Problem List    Diagnosis Date Noted    Advance directive discussed with patient 2017    OAB (overactive bladder) 2016    Obesity (BMI 30.0-34.9) 2014    Hypothyroid 10/10/2013    IBS (irritable bowel syndrome) 10/10/2011    Fibrocystic breast 10/10/2011    HTN (hypertension) 10/10/2011     Medical History:     Past Medical History:   Diagnosis Date    Fibrocystic breast disease     Hypertension     Hypothyroid 1998    s/p I131 treatment - [place]    IBS (irritable bowel syndrome)     POP on CPAP     4mm    Pilonidal cyst 1968     Allergies: Allergies   Allergen Reactions    Latex Unknown (comments)    Augmentin [Amoxicillin-Pot Clavulanate] Diarrhea     She can tolerate Amoxicillin    Erythromycin Unknown (comments)    Motrin [Ibuprofen] Swelling     legs      Medications:     Current Outpatient Prescriptions   Medication Sig    calcium-cholecalciferol, d3, (CALCIUM 600 + D) 600-125 mg-unit tab Take  by mouth.  SYNTHROID 100 mcg tablet TAKE ONE TABLET DAILY BEFORE BREAKFAST AND ONE AND ONE-HALF TABLETS ON     hydroCHLOROthiazide (HYDRODIURIL) 25 mg tablet TAKE 1 TABLET DAILY     No current facility-administered medications for this visit.       Surgical History:     Past Surgical History:   Procedure Laterality Date    BREAST SURGERY PROCEDURE UNLISTED  10/09    benign lumpectomy - left    HX APPENDECTOMY  1960    HX CYST REMOVAL      HX DILATION AND CURETTAGE      HX GYN  7982,1078    exp laparoscopy for endometriosis    HX KNEE REPLACEMENT  9/10/10    right    HX ORTHOPAEDIC      r knee arthroscopy    HX ORTHOPAEDIC 12/01    left thumb and forearm surg     Social History:     Social History     Social History    Marital status:      Spouse name: N/A    Number of children: N/A    Years of education: N/A     Social History Main Topics    Smoking status: Never Smoker    Smokeless tobacco: Never Used    Alcohol use No    Drug use: None    Sexual activity: Yes     Partners: Male     Other Topics Concern    None     Social History Narrative       Anesthesia Complications: None  History of abnormal bleeding : None  History of Blood Transfusions: no  Health Care Directive or Living Will: no    Objective:     ROS:   Feeling well. No dyspnea or chest pain on exertion. No abdominal pain, change in bowel habits (some diarrhea and constipation but unchanged), black or bloody stools. No urinary tract symptoms. No neurological complaints - tremor. OBJECTIVE:   The patient appears well, alert, oriented x 3, in no distress. Visit Vitals    /78    Pulse 75    Temp 97.8 °F (36.6 °C) (Oral)    Resp 18    Ht 5' 5\" (1.651 m)    Wt 181 lb 6.4 oz (82.3 kg)    SpO2 98%    BMI 30.19 kg/m2     HEENT:  ENT normal.  Neck supple. No adenopathy or thyromegaly. STACEY. Chest: Lungs are clear, good air entry, no wheezes, rhonchi or rales. Cardiovascular: S1 and S2 normal, no murmurs, regular rate and rhythm. Abdomen: soft without tenderness, guarding, mass or organomegaly. Extremities: show no edema, normal peripheral pulses. Neurological: resting tremor left hand with + cogwheeling (have discussed suspicions of parkinsons and encouraged neurology referral)      DIAGNOSTICS:   None indicated at this time.       IMPRESSION:   Low risk for planned surgery  No contraindications to planned surgery    Annalise Arriaga MD   1/8/2018

## 2018-03-19 ENCOUNTER — OFFICE VISIT (OUTPATIENT)
Dept: INTERNAL MEDICINE CLINIC | Age: 68
End: 2018-03-19

## 2018-03-19 VITALS
TEMPERATURE: 98 F | WEIGHT: 183.4 LBS | HEART RATE: 77 BPM | BODY MASS INDEX: 30.56 KG/M2 | DIASTOLIC BLOOD PRESSURE: 83 MMHG | RESPIRATION RATE: 16 BRPM | OXYGEN SATURATION: 99 % | SYSTOLIC BLOOD PRESSURE: 163 MMHG | HEIGHT: 65 IN

## 2018-03-19 DIAGNOSIS — R25.1 TREMOR: ICD-10-CM

## 2018-03-19 DIAGNOSIS — I10 ESSENTIAL HYPERTENSION: Primary | ICD-10-CM

## 2018-03-19 DIAGNOSIS — E03.9 ACQUIRED HYPOTHYROIDISM: ICD-10-CM

## 2018-03-19 DIAGNOSIS — M54.41 ACUTE RIGHT-SIDED LOW BACK PAIN WITH RIGHT-SIDED SCIATICA: ICD-10-CM

## 2018-03-19 DIAGNOSIS — Z71.89 ADVANCE DIRECTIVE DISCUSSED WITH PATIENT: ICD-10-CM

## 2018-03-19 RX ORDER — LISINOPRIL AND HYDROCHLOROTHIAZIDE 20; 25 MG/1; MG/1
1 TABLET ORAL DAILY
Qty: 30 TAB | Refills: 5 | Status: SHIPPED | OUTPATIENT
Start: 2018-03-19 | End: 2018-04-16 | Stop reason: SINTOL

## 2018-03-19 NOTE — PROGRESS NOTES
Subjective:     Zeferino Valero is a 75 y.o. female who presents for follow up of hypertension and hypothyroidism. Diet and Lifestyle: generally follows a low sodium diet, sedentary secondary to back. Some stretching exercises. Home BP Monitoring: is not measured at home but blood pressure was elevated during her surgery. Cardiovascular ROS: taking medications as instructed, no medication side effects noted, no TIA's, no chest pain on exertion, no dyspnea on exertion, no swelling of ankles, no orthostatic dizziness or lightheadedness, no palpitations. New concerns:   Had blepharoplasty - reconstruction with stent completed. Did well. Needs cataract removed right eye but waiting to fully heal from surgery. Increasing pain in lower back. Prior to last visit fell and hit buttocks. Pain has worsened since fall. Worse getting out of car - stiff, takes several steps before feels better. Radiates to right buttocks but not down right leg. At night not sleeping well secondary to her back pain. Tries not to take any OTC anti-inflammatories. Would like to try PT. Tremors are getting worse. Increased stress. Has an appointment in May for evaluation of her tremor. Worse in ? Left hand but issues with both. Handwriting is horrible. Trouble eating secondary to tremor. Feels thyroid is well controlled. Current Outpatient Prescriptions   Medication Sig Dispense Refill    calcium-cholecalciferol, d3, (CALCIUM 600 + D) 600-125 mg-unit tab Take  by mouth.       SYNTHROID 100 mcg tablet TAKE ONE TABLET DAILY BEFORE BREAKFAST AND ONE AND ONE-HALF TABLETS ON SUNDAY 105 Tab 3    hydroCHLOROthiazide (HYDRODIURIL) 25 mg tablet TAKE 1 TABLET DAILY 90 Tab 3        Lab Results  Component Value Date/Time   Cholesterol, total 202 (H) 06/06/2016 12:11 PM   HDL Cholesterol 58 06/06/2016 12:11 PM   LDL, calculated 117 (H) 06/06/2016 12:11 PM   Triglyceride 135 06/06/2016 12:11 PM     Lab Results  Component Value Date/Time   ALT (SGPT) 11 06/06/2016 12:11 PM   AST (SGOT) 14 06/06/2016 12:11 PM   Alk. phosphatase 84 06/06/2016 12:11 PM   Bilirubin, total 0.9 06/06/2016 12:11 PM   Albumin 4.3 06/06/2016 12:11 PM   Protein, total 7.1 06/06/2016 12:11 PM   PLATELET 900 38/31/3096 10:00 AM       Lab Results  Component Value Date/Time   GFR est non-AA 53 (L) 07/24/2017 01:10 PM   GFR est AA 61 07/24/2017 01:10 PM   Creatinine 1.08 (H) 07/24/2017 01:10 PM   BUN 23 07/24/2017 01:10 PM   Sodium 139 07/24/2017 01:10 PM   Potassium 3.9 07/24/2017 01:10 PM   Chloride 99 07/24/2017 01:10 PM   CO2 25 07/24/2017 01:10 PM     Lab Results  Component Value Date/Time   TSH 3.860 07/24/2017 01:10 PM   T4, Free 1.63 07/24/2017 01:10 PM         Review of Systems, additional:  Pertinent items are noted in HPI. Objective:     Visit Vitals    /83 (BP 1 Location: Left arm, BP Patient Position: Sitting)    Pulse 77    Temp 98 °F (36.7 °C) (Oral)    Resp 16    Ht 5' 5\" (1.651 m)    Wt 183 lb 6.4 oz (83.2 kg)    SpO2 99%    BMI 30.52 kg/m2     Appearance: alert, well appearing, and in no distress and oriented to person, place, and time. General exam:   . NECK: supple, no lad, no bruit, no tm  LUNGS: cta bilat  CV rrr, no m/g/r  ABD: soft, nt, nd, nabs  EXT: no c/c/e  BACK - mild tenderness over lumbar pain and bilat paraspinal muscles. Buttocks nontender. SLR negative. Neuro - bilat arm cogwheeling, intentional tremor left worse than right    Assessment/Plan:     hypertension borderline controlled. the following changes are made - stop hctz and start lisinopril hctz. Hypothyroid - controlled  Check labs and adjust meds prn    Tremor - ?  Benign tremor vs parkinsons  Agree with neuro referral    Low back pain with right sciatica - referred to PT, xray    Orders Placed This Encounter    XR SPINE LUMB 2 OR 3 V    METABOLIC PANEL, COMPREHENSIVE    LIPID PANEL    T4, FREE    TSH 3RD GENERATION    REFERRAL TO PHYSICAL THERAPY    lisinopril-hydroCHLOROthiazide (PRINZIDE, ZESTORETIC) 20-25 mg per tablet     Follow-up Disposition:  Return in about 4 weeks (around 4/16/2018) for htn.

## 2018-03-19 NOTE — PROGRESS NOTES
Chief Complaint   Patient presents with    Tremors     F/u will see neuro MD for parkinsons     Back Pain     in and out of cars the worse       1. Have you been to the ER, urgent care clinic since your last visit? Hospitalized since your last visit? No    2. Have you seen or consulted any other health care providers outside of the [address] [place] [address] since your last visit? Include any pap smears or colon screening.  No

## 2018-03-19 NOTE — MR AVS SNAPSHOT
[address] [place] [address] [address] [facility] 
[phone] Patient: Ebony Luna MRN: [id number] OWY:[birth year] Visit Information Date & Time Provider Department Dept. Phone Encounter #  
 3/19/2018 11:40 AM Elvis Park MD [facility] [phone] [id number] Follow-up Instructions Return in about 4 weeks (around 4/16/2018) for htn. Your Appointments 4/16/2018 10:20 AM  
ROUTINE CARE with Elvis Park MD  
[facility] [address]) Appt Note: 1 month follow up visit [facility] [address] [facility] [id number]  
[address] [id number] Upcoming Health Maintenance Date Due COLONOSCOPY 5/4/1967 DTaP/Tdap/Td series (1 - Tdap) 5/4/1970 Influenza Age 5 to Adult 8/1/2017 MEDICARE YEARLY EXAM 7/18/2018 BREAST CANCER SCRN MAMMOGRAM 9/26/2018 GLAUCOMA SCREENING Q2Y 10/27/2019 Allergies as of 3/19/2018  Review Complete On: 3/19/2018 By: Elvis Park MD  
  
 Severity Noted Reaction Type Reactions Latex  10/24/2011    Unknown (comments) Augmentin [Amoxicillin-pot Clavulanate]  01/07/2013    Diarrhea She can tolerate Amoxicillin Erythromycin  10/10/2011    Unknown (comments) Motrin [Ibuprofen]  10/10/2011    Swelling  
 legs Current Immunizations  Reviewed on 8/4/2015 Name Date Influenza Vaccine 10/15/2017, 11/12/2014, 10/10/2013 Influenza Vaccine Split 10/24/2011 11:52 AM  
 Pneumococcal Conjugate (PCV-13) 6/1/2016 Pneumococcal Polysaccharide (PPSV-23) 11/12/2014 Zoster Vaccine, Live 8/3/2015 Not reviewed this visit You Were Diagnosed With   
  
 Codes Comments Essential hypertension    -  Primary ICD-10-CM: I10 
ICD-9-CM: 401.9 Acquired hypothyroidism     ICD-10-CM: E03.9 ICD-9-CM: 244.9 Tremor     ICD-10-CM: R25.1 ICD-9-CM: 781.0 Acute right-sided low back pain with right-sided sciatica     ICD-10-CM: M54.41 
ICD-9-CM: 724.2, 724.3 Advance directive discussed with patient     ICD-10-CM: Z71.89 ICD-9-CM: V65.49 Vitals BP Pulse Temp Resp Height(growth percentile) Weight(growth percentile) 163/83 (BP 1 Location: Left arm, BP Patient Position: Sitting) 77 98 °F (36.7 °C) (Oral) 16 5' 5\" (1.651 m) 183 lb 6.4 oz (83.2 kg) SpO2 BMI OB Status Smoking Status 99% 30.52 kg/m2 Postmenopausal Never Smoker Vitals History BMI and BSA Data Body Mass Index Body Surface Area 30.52 kg/m 2 1.95 m 2 Preferred Pharmacy Pharmacy Name Phone [id number] Ashli Capps, [address] [place] [address] [phone] Your Updated Medication List  
  
   
This list is accurate as of 3/19/18 12:27 PM.  Always use your most recent med list.  
  
  
  
  
 CALCIUM 600 + D 600-125 mg-unit Tab Generic drug:  calcium-cholecalciferol (d3) Take  by mouth.  
  
 lisinopril-hydroCHLOROthiazide 20-25 mg per tablet Commonly known as:  Sarai Scrape Take 1 Tab by mouth daily. SYNTHROID 100 mcg tablet Generic drug:  levothyroxine TAKE ONE TABLET DAILY BEFORE BREAKFAST AND ONE AND ONE-HALF TABLETS ON SUNDAY Prescriptions Sent to Pharmacy Refills  
 lisinopril-hydroCHLOROthiazide (PRINZIDE, ZESTORETIC) 20-25 mg per tablet 5 Sig: Take 1 Tab by mouth daily. Class: Normal  
 Pharmacy: [facility] [address], [address] [place] [address] Ph #: [phone] Route: Oral  
  
We Performed the Following LIPID PANEL [83514 CPT(R)] METABOLIC PANEL, COMPREHENSIVE [81881 CPT(R)] REFERRAL TO PHYSICAL THERAPY [AKK76 Custom] Comments: Low back pain radiating to right buttocks T4, FREE M0488656 CPT(R)] TSH 3RD GENERATION [88896 CPT(R)] Follow-up Instructions Return in about 4 weeks (around 4/16/2018) for htn. To-Do List   
 03/19/2018 Imaging:  XR SPINE LUMB 2 OR 3 V Referral Information Referral ID Referred By Referred To  
  
 [id number] CONOR HERRERA Not Available Visits Status Start Date End Date 1 New Request 3/19/18 3/19/19 If your referral has a status of pending review or denied, additional information will be sent to support the outcome of this decision. Introducing [facility]! Betty Muñoz introduces [email or web address] patient portal. Now you can access parts of your medical record, email your doctor's office, and request medication refills online. 1. In your internet browser, go to [email or web address] 2. Click on the First Time User? Click Here link in the Sign In box. You will see the New Member Sign Up page. 3. Enter your [employer] Access Code exactly as it appears below. You will not need to use this code after youve completed the sign-up process. If you do not sign up before the expiration date, you must request a new code. · [employer] Access Code: [email or web address] Expires: 6/17/2018 12:27 PM 
 
4. Enter the last four digits of your Social Security Number (xxxx) and Date of Birth (mm/dd/yyyy) as indicated and click Submit. You will be taken to the next sign-up page. 5. Create a [employer] ID. This will be your [employer] login ID and cannot be changed, so think of one that is secure and easy to remember. 6. Create a [employer] password. You can change your password at any time. 7. Enter your Password Reset Question and Answer. This can be used at a later time if you forget your password. 8. Enter your e-mail address. You will receive e-mail notification when new information is available in [address] 9. Click Sign Up. You can now view and download portions of your medical record. 10. Click the Download Summary menu link to download a portable copy of your medical information. If you have questions, please visit the Frequently Asked Questions section of the [employer] website. Remember, [employer] is NOT to be used for urgent needs. For medical emergencies, dial 911. Now available from your iPhone and Android! Please provide this summary of care documentation to your next provider. Your primary care clinician is listed as CON[facility] ENGLISH. If you have any questions after today's visit, please call [phone].

## 2018-04-04 ENCOUNTER — HOSPITAL ENCOUNTER (OUTPATIENT)
Dept: GENERAL RADIOLOGY | Age: 68
Discharge: HOME | End: 2018-04-04
Payer: MEDICARE

## 2018-04-04 DIAGNOSIS — M54.41 ACUTE RIGHT-SIDED LOW BACK PAIN WITH RIGHT-SIDED SCIATICA: ICD-10-CM

## 2018-04-04 PROCEDURE — 72100 X-RAY EXAM L-S SPINE 2/3 VWS: CPT

## 2018-04-04 NOTE — PROGRESS NOTES
Unable to reach patient at any of the listed phone #'s. Both numbers out of service. Work # deleted patient no longer works for that company.

## 2018-04-04 NOTE — PROGRESS NOTES
Please let her know that her xray showed arthritis of her lower spine but no fractures. Please start physical therapy as recommended at last visit.

## 2018-04-16 ENCOUNTER — OFFICE VISIT (OUTPATIENT)
Dept: INTERNAL MEDICINE CLINIC | Age: 68
End: 2018-04-16

## 2018-04-16 VITALS
OXYGEN SATURATION: 100 % | HEIGHT: 65 IN | TEMPERATURE: 98 F | SYSTOLIC BLOOD PRESSURE: 107 MMHG | WEIGHT: 179.4 LBS | HEART RATE: 64 BPM | RESPIRATION RATE: 18 BRPM | BODY MASS INDEX: 29.89 KG/M2 | DIASTOLIC BLOOD PRESSURE: 57 MMHG

## 2018-04-16 DIAGNOSIS — I10 ESSENTIAL HYPERTENSION: Primary | ICD-10-CM

## 2018-04-16 DIAGNOSIS — M54.41 ACUTE RIGHT-SIDED LOW BACK PAIN WITH RIGHT-SIDED SCIATICA: ICD-10-CM

## 2018-04-16 RX ORDER — LOSARTAN POTASSIUM AND HYDROCHLOROTHIAZIDE 25; 100 MG/1; MG/1
1 TABLET ORAL DAILY
Qty: 30 TAB | Refills: 5 | Status: SHIPPED | OUTPATIENT
Start: 2018-04-16 | End: 2018-07-31 | Stop reason: SDUPTHER

## 2018-04-16 NOTE — PROGRESS NOTES
Subjective:     Guadalupe Storey is a 75 y.o. female who presents for follow up of hypertension. Last visit stopped hctz and started on lisinopril/hctz for elevated blood pressure. Since then has developed a cough. Diet and Lifestyle: not attempting to follow a low sodium diet, sedentary - see back pain below. Has lost a couple of lbs. Home BP Monitoring: is not measured at home    Cardiovascular ROS: taking medications as instructed, side effects noted by patient include dry cough, no TIA's, no chest pain on exertion, no dyspnea on exertion, no swelling of ankles, no orthostatic dizziness or lightheadedness, no palpitations. New concerns:   Continued lower back pain post fall. Worse getting up from chair or out of car. Right buttocks which radiates down leg. Can also radiate to left side. Worse while sitting, better when gets up and out of car. Xray showed degenerative changes but no fractures. Has a PT referral but wanted to wait until xray results came back. Rare otc analgesics. .     Current Outpatient Prescriptions   Medication Sig Dispense Refill    lisinopril-hydroCHLOROthiazide (PRINZIDE, ZESTORETIC) 20-25 mg per tablet Take 1 Tab by mouth daily. 30 Tab 5    calcium-cholecalciferol, d3, (CALCIUM 600 + D) 600-125 mg-unit tab Take  by mouth.  SYNTHROID 100 mcg tablet TAKE ONE TABLET DAILY BEFORE BREAKFAST AND ONE AND ONE-HALF TABLETS ON SUNDAY 105 Tab 3        Lab Results  Component Value Date/Time   GFR est non-AA 53 (L) 07/24/2017 01:10 PM   GFR est AA 61 07/24/2017 01:10 PM   Creatinine 1.08 (H) 07/24/2017 01:10 PM   BUN 23 07/24/2017 01:10 PM   Sodium 139 07/24/2017 01:10 PM   Potassium 3.9 07/24/2017 01:10 PM   Chloride 99 07/24/2017 01:10 PM   CO2 25 07/24/2017 01:10 PM        Review of Systems, additional:  Pertinent items are noted in HPI.     Objective:     Visit Vitals    /57 (BP 1 Location: Right arm, BP Patient Position: Sitting)    Pulse 64    Temp 98 °F (36.7 °C) (Oral)    Resp 18    Ht 5' 5\" (1.651 m)    Wt 179 lb 6.4 oz (81.4 kg)    SpO2 100%    BMI 29.85 kg/m2     Appearance: alert, well appearing, and in no distress and oriented to person, place, and time. General exam:   NECK: supple, no lad, no bruit, no tm  LUNGS: cta bilat  CV rrr, no m/g/r  ABD: soft, nt, nd, nabs  EXT: no c/c/e. Assessment/Plan:     hypertension well controlled, but side effects of dry cough. the following changes are made - change from lisinopril/hctz to losartan/hctz. Low back pain with right sciatica - PT referral.      Orders Placed This Encounter    losartan-hydroCHLOROthiazide (HYZAAR) 100-25 mg per tablet     Follow-up Disposition:  Return in about 6 weeks (around 5/28/2018) for htn.

## 2018-04-16 NOTE — MR AVS SNAPSHOT
[address] [place] [address] [address] [facility] 
[phone] Patient: Fabby Angela MRN: [id number] SNB:[birth year] Visit Information Date & Time Provider Department Dept. Phone Encounter #  
 4/16/2018 10:20 AM Ricky Dumas MD [facility] [phone] [id number] Follow-up Instructions Return in about 6 weeks (around 5/28/2018) for htn. Follow-up and Disposition History Your Appointments 5/25/2018 12:00 PM  
ROUTINE CARE with Ricky Dumas MD  
[facility] [address]) [facility] [address] [place] [address] [phone] Upcoming Health Maintenance Date Due COLONOSCOPY 5/4/1967 DTaP/Tdap/Td series (1 - Tdap) 5/4/1970 MEDICARE YEARLY EXAM 7/18/2018 BREAST CANCER SCRN MAMMOGRAM 9/26/2018 GLAUCOMA SCREENING Q2Y 10/27/2019 Allergies as of 4/16/2018  Review Complete On: 4/16/2018 By: Ricky Dumas MD  
  
 Severity Noted Reaction Type Reactions Latex  10/24/2011    Unknown (comments) Augmentin [Amoxicillin-pot Clavulanate]  01/07/2013    Diarrhea She can tolerate Amoxicillin Erythromycin  10/10/2011    Unknown (comments) Motrin [Ibuprofen]  10/10/2011    Swelling  
 legs Current Immunizations  Reviewed on 8/4/2015 Name Date Influenza Vaccine 10/15/2017, 11/12/2014, 10/10/2013 Influenza Vaccine Split 10/24/2011 11:52 AM  
 Pneumococcal Conjugate (PCV-13) 6/1/2016 Pneumococcal Polysaccharide (PPSV-23) 11/12/2014 Zoster Vaccine, Live 8/3/2015 Not reviewed this visit You Were Diagnosed With   
  
 Codes Comments Essential hypertension    -  Primary ICD-10-CM: I10 
ICD-9-CM: 401.9 Acute right-sided low back pain with right-sided sciatica     ICD-10-CM: M54.41 
ICD-9-CM: 724.2, 724.3 Vitals BP Pulse Temp Resp Height(growth percentile) Weight(growth percentile) 107/57 (BP 1 Location: Right arm, BP Patient Position: Sitting) 64 98 °F (36.7 °C) (Oral) 18 5' 5\" (1.651 m) 179 lb 6.4 oz (81.4 kg) SpO2 BMI OB Status Smoking Status 100% 29.85 kg/m2 Postmenopausal Never Smoker BMI and BSA Data Body Mass Index Body Surface Area  
 29.85 kg/m 2 1.93 m 2 Preferred Pharmacy Pharmacy Name Phone [id number] Rony Friend, [address] [place] [address] [phone] Your Updated Medication List  
  
   
This list is accurate as of 4/16/18 10:44 AM.  Always use your most recent med list.  
  
  
  
  
 CALCIUM 600 + D 600-125 mg-unit Tab Generic drug:  calcium-cholecalciferol (d3) Take  by mouth.  
  
 losartan-hydroCHLOROthiazide 100-25 mg per tablet Commonly known as:  HYZAAR Take 1 Tab by mouth daily. SYNTHROID 100 mcg tablet Generic drug:  levothyroxine TAKE ONE TABLET DAILY BEFORE BREAKFAST AND ONE AND ONE-HALF TABLETS ON SUNDAY Prescriptions Sent to Pharmacy Refills  
 losartan-hydroCHLOROthiazide (HYZAAR) 100-25 mg per tablet 5 Sig: Take 1 Tab by mouth daily. Class: Normal  
 Pharmacy: [facility]/pharmacy [address], [address] [place] [address] Ph #: [phone] Route: Oral  
  
Follow-up Instructions Return in about 6 weeks (around 5/28/2018) for htn. Introducing [facility]! Valdemar Nicholas introduces Quintiles patient portal. Now you can access parts of your medical record, email your doctor's office, and request medication refills online. 1. In your internet browser, go to [email or web address] 2. Click on the First Time User? Click Here link in the Sign In box. You will see the New Member Sign Up page. 3. Enter your Quintiles Access Code exactly as it appears below.  You will not need to use this code after youve completed the sign-up process. If you do not sign up before the expiration date, you must request a new code. · ESKY Access Code: [email or web address] Expires: 6/17/2018 12:27 PM 
 
4. Enter the last four digits of your Social Security Number (xxxx) and Date of Birth (mm/dd/yyyy) as indicated and click Submit. You will be taken to the next sign-up page. 5. Create a ESKY ID. This will be your ESKY login ID and cannot be changed, so think of one that is secure and easy to remember. 6. Create a ESKY password. You can change your password at any time. 7. Enter your Password Reset Question and Answer. This can be used at a later time if you forget your password. 8. Enter your e-mail address. You will receive e-mail notification when new information is available in [address] 9. Click Sign Up. You can now view and download portions of your medical record. 10. Click the Download Summary menu link to download a portable copy of your medical information. If you have questions, please visit the Frequently Asked Questions section of the ESKY website. Remember, ESKY is NOT to be used for urgent needs. For medical emergencies, dial 911. Now available from your iPhone and Android! Please provide this summary of care documentation to your next provider. Your primary care clinician is listed as [employer] ENGLISH. If you have any questions after today's visit, please call [phone].

## 2018-04-16 NOTE — PROGRESS NOTES
Chief Complaint   Patient presents with    Follow Up Chronic Condition     HTN- Coughing spells d/t ACE       1. Have you been to the ER, urgent care clinic since your last visit? Hospitalized since your last visit? No    2. Have you seen or consulted any other health care providers outside of the [address] [place] [address] since your last visit? Include any pap smears or colon screening.  No

## 2018-07-31 ENCOUNTER — OFFICE VISIT (OUTPATIENT)
Dept: INTERNAL MEDICINE CLINIC | Age: 68
End: 2018-07-31

## 2018-07-31 VITALS
BODY MASS INDEX: 30.46 KG/M2 | TEMPERATURE: 96.8 F | WEIGHT: 182.8 LBS | DIASTOLIC BLOOD PRESSURE: 72 MMHG | RESPIRATION RATE: 18 BRPM | OXYGEN SATURATION: 99 % | SYSTOLIC BLOOD PRESSURE: 112 MMHG | HEART RATE: 75 BPM | HEIGHT: 65 IN

## 2018-07-31 DIAGNOSIS — E78.9 BORDERLINE HIGH CHOLESTEROL: ICD-10-CM

## 2018-07-31 DIAGNOSIS — E03.9 ACQUIRED HYPOTHYROIDISM: ICD-10-CM

## 2018-07-31 DIAGNOSIS — Z13.31 SCREENING FOR DEPRESSION: ICD-10-CM

## 2018-07-31 DIAGNOSIS — Z13.39 SCREENING FOR ALCOHOLISM: ICD-10-CM

## 2018-07-31 DIAGNOSIS — Z71.89 ADVANCE DIRECTIVE DISCUSSED WITH PATIENT: ICD-10-CM

## 2018-07-31 DIAGNOSIS — G20 PARKINSON DISEASE (HCC): ICD-10-CM

## 2018-07-31 DIAGNOSIS — G47.33 OSA (OBSTRUCTIVE SLEEP APNEA): ICD-10-CM

## 2018-07-31 DIAGNOSIS — E66.9 OBESITY (BMI 30.0-34.9): ICD-10-CM

## 2018-07-31 DIAGNOSIS — I10 ESSENTIAL HYPERTENSION: Primary | ICD-10-CM

## 2018-07-31 DIAGNOSIS — Z00.00 INITIAL MEDICARE ANNUAL WELLNESS VISIT: ICD-10-CM

## 2018-07-31 RX ORDER — LOSARTAN POTASSIUM AND HYDROCHLOROTHIAZIDE 25; 100 MG/1; MG/1
1 TABLET ORAL DAILY
Qty: 90 TAB | Refills: 3 | Status: SHIPPED | OUTPATIENT
Start: 2018-07-31 | End: 2019-05-31 | Stop reason: DRUGHIGH

## 2018-07-31 RX ORDER — LEVOTHYROXINE SODIUM 100 UG/1
TABLET ORAL
Qty: 105 TAB | Refills: 3 | Status: SHIPPED | OUTPATIENT
Start: 2018-07-31 | End: 2019-08-17 | Stop reason: SDUPTHER

## 2018-07-31 RX ORDER — GLUCOSAMINE SULFATE 1500 MG
1000 POWDER IN PACKET (EA) ORAL DAILY
COMMUNITY

## 2018-07-31 RX ORDER — PREDNISOLONE ACETATE 10 MG/ML
SUSPENSION/ DROPS OPHTHALMIC
Refills: 0 | COMMUNITY
Start: 2018-07-20 | End: 2019-04-17 | Stop reason: ALTCHOICE

## 2018-07-31 RX ORDER — CARBIDOPA AND LEVODOPA 25; 100 MG/1; MG/1
TABLET ORAL
Refills: 3 | COMMUNITY
Start: 2018-07-12

## 2018-07-31 NOTE — PROGRESS NOTES
Subjective:  
 
Tessa Nesbitt is a 70 y.o. female who presents for follow up of hypertension. Diet and Lifestyle: generally follows a low sodium diet Home BP Monitoring: is not measured at home Cardiovascular ROS: taking medications as instructed, no medication side effects noted, no TIA's, no chest pain on exertion, no dyspnea on exertion, no swelling of ankles, no orthostatic dizziness or lightheadedness, no palpitations. Last couple days having some chest discomfort - not with exertion. Blames on stress. New concerns:  
Saw Dr. Yonas Meeks at [facility] who diagnosed her with Parkinsons. Taking Sinemet 2 tabs tid. Just completed PT and OT for Parkinsons. [place] last year onto back, within the last month fell over curb -  caught her before she fell on the ground. Unsteady still when walking. Looking into a Parkinsons exercise group. Tremor is fine and comes and goes, not bothersome for pt. Shuffles when walking. False starts. Stays very tired, falls asleep when sits down for any length of time. Snores at night.  + apnic spells. Told had mild sleep apnea in the past but could not stand the mask. Basal cell ca right lower lid which was starting to invade her tear duct. Had excision with reconstruction. Stent in duct just removed which helped with improving her vision. Bilat cataracts - needs to have surgery. Hypothyroid - taking Synthroid daily. Stays tired. Denies changes in weight, hair or skin, constipation. Current Outpatient Prescriptions Medication Sig Dispense Refill  prednisoLONE acetate (PRED FORTE) 1 % ophthalmic suspension INSTILL 1 DROP INTO AFFECTED EYES 4 TIMES X1 WK, 3 TIMES DAILY X1 WK, 2 TIMES X1 WK, 1 TIME X1 WEEK  0  
 carbidopa-levodopa (SINEMET)  mg per tablet TAKE 2 TAB BY MOUTH THREE TIMES A DAY FOLLOW DIRECTIONS GIVEN IN [facility]  3  
 losartan-hydroCHLOROthiazide (HYZAAR) 100-25 mg per tablet Take 1 Tab by mouth daily. 30 Tab 5  calcium-cholecalciferol, d3, (CALCIUM 600 + D) 600-125 mg-unit tab Take  by mouth.  SYNTHROID 100 mcg tablet TAKE ONE TABLET DAILY BEFORE BREAKFAST AND ONE AND ONE-HALF TABLETS ON SUNDAY 105 Tab 3 Lab Results Component Value Date/Time GFR est non-AA 53 (L) 07/24/2017 01:10 PM  
GFR est AA 61 07/24/2017 01:10 PM  
Creatinine 1.08 (H) 07/24/2017 01:10 PM  
BUN 23 07/24/2017 01:10 PM  
Sodium 139 07/24/2017 01:10 PM  
Potassium 3.9 07/24/2017 01:10 PM  
Chloride 99 07/24/2017 01:10 PM  
CO2 25 07/24/2017 01:10 PM  
 
Lab Results Component Value Date/Time TSH 3.860 07/24/2017 01:10 PM  
T4, Free 1.63 07/24/2017 01:10 PM  
   
 
Review of Systems, additional: 
Pertinent items are noted in HPI. Objective:  
 
Visit Vitals  /72  Pulse 75  Temp 96.8 °F (36 °C) (Oral)  Resp 18  Ht 5' 5\" (1.651 m)  Wt 182 lb 12.8 oz (82.9 kg)  SpO2 99%  BMI 30.42 kg/m2 Appearance: alert, well appearing, and in no distress and oriented to person, place, and time. General exam: NECK: supple, no lad, no bruit, no tm LUNGS: cta bilat CV rrr, no m/g/r ABD: soft, nt, nd, nabs EXT: no c/c/e. Assessment/Plan:  
 
hypertension well controlled. current treatment plan is effective, no change in therapy. parkinsons - new diagnosis Continue sinemet and regular exercise F/u with neurology Hypothyroid - clinically euthyroid Check labs and adjust meds prn 
 
POP - did not tolerate CPAP Refer back to sleep studies ? Oral appliance for treatment. Advanced directive discussed with pt. Obesity - discussed diet and exercise for weight loss Borderline cholesterol - repeat labs. Orders Placed This Encounter  METABOLIC PANEL, COMPREHENSIVE  LIPID PANEL  
 TSH RFX ON ABNORMAL TO FREE T4  
 REFERRAL TO SLEEP STUDIES  prednisoLONE acetate (PRED FORTE) 1 % ophthalmic suspension  carbidopa-levodopa (SINEMET)  mg per tablet  losartan-hydroCHLOROthiazide (HYZAAR) 100-25 mg per tablet  SYNTHROID 100 mcg tablet  cholecalciferol (VITAMIN D3) 1,000 unit cap  varicella-zoster recombinant, PF, (SHINGRIX, PF,) 50 mcg/0.5 mL susr injection  diph,pertuss,acel,,tetanus vac,PF, (ADACEL) 2 Lf-(2.5-5-3-5 mcg)-5Lf/0.5 mL syrg vaccine Follow-up Disposition: Not on File

## 2018-07-31 NOTE — PROGRESS NOTES
Chief Complaint Patient presents with  Follow Up Chronic Condition HTN  
 Depression  
  discuss dx of parkinson's 1. Have you been to the ER, urgent care clinic since your last visit? Hospitalized since your last visit? No 
 
2. Have you seen or consulted any other health care providers outside of the [address] [place] [address] since your last visit? Include any pap smears or colon screening.  No

## 2018-07-31 NOTE — PROGRESS NOTES
Dr. Carine Ramirez referred CE, 1950, a 70 y.o. female for a Medicare Annual Wellness Visit (AWV). This is an Initial Medicare Annual Wellness Exam (AWV) (Performed 12 months after IPPE or effective date of Medicare Part B enrollment, Once in a lifetime) I have reviewed the patient's medical history in detail and updated the computerized patient record. History Past Medical History:  
Diagnosis Date  Fibrocystic breast disease  Hypertension  Hypothyroid 2/1998  
 s/p I131 treatment - [place]  
 IBS (irritable bowel syndrome)  POP on CPAP   
 4mm  Pilonidal cyst 1968 Past Surgical History:  
Procedure Laterality Date  BREAST SURGERY PROCEDURE UNLISTED  10/09  
 benign lumpectomy - left [address]  HX CYST REMOVAL    
 HX DILATION AND CURETTAGE  11/00  
[facility]  [id number]  
 exp laparoscopy for endometriosis  HX KNEE REPLACEMENT  9/10/10  
 right  HX ORTHOPAEDIC    
 r knee arthroscopy  HX ORTHOPAEDIC  12/01  
 left thumb and forearm surg Current Outpatient Prescriptions Medication Sig Dispense Refill  prednisoLONE acetate (PRED FORTE) 1 % ophthalmic suspension INSTILL 1 DROP INTO AFFECTED EYES 4 TIMES X1 WK, 3 TIMES DAILY X1 WK, 2 TIMES X1 WK, 1 TIME X1 WEEK  0  
 carbidopa-levodopa (SINEMET)  mg per tablet TAKE 2 TAB BY MOUTH THREE TIMES A DAY FOLLOW DIRECTIONS GIVEN IN [facility]  3  
 losartan-hydroCHLOROthiazide (HYZAAR) 100-25 mg per tablet Take 1 Tab by mouth daily. 90 Tab 3  
 SYNTHROID 100 mcg tablet TAKE ONE TABLET DAILY BEFORE BREAKFAST AND ONE AND ONE-HALF TABLETS ON SUNDAY 105 Tab 3  cholecalciferol (VITAMIN D3) 1,000 unit cap Take 1,000 Units by mouth daily.  varicella-zoster recombinant, PF, (SHINGRIX, PF,) 50 mcg/0.5 mL susr injection 0.5mL by IntraMUSCular route once now and then repeat in 2-6 months 0.5 mL 1  diph,pertuss,acel,,tetanus vac,PF, (ADACEL) 2 Lf-(2.5-5-3-5 mcg)-5Lf/0.5 mL syrg vaccine 0.5 mL by IntraMUSCular route once for 1 dose. 0.5 mL 0 Allergies Allergen Reactions  Latex Unknown (comments)  Augmentin [Amoxicillin-Pot Clavulanate] Diarrhea She can tolerate Amoxicillin  Erythromycin Unknown (comments)  Motrin [Ibuprofen] Swelling  
  legs Family History Problem Relation Age of Onset  Hypertension Mother  Heart Attack Mother 80  
 COPD Father  Dementia Father   
  alzeihmers  Other Father PUD Social History Substance Use Topics  Smoking status: Never Smoker  Smokeless tobacco: Never Used  Alcohol use No  
 
Patient Active Problem List  
Diagnosis Code  IBS (irritable bowel syndrome) K58.9  Fibrocystic breast N60.19  
 HTN (hypertension) I10  
 Hypothyroid E03.9  Obesity (BMI 30.0-34. 9) E66.9  
 OAB (overactive bladder) N32.81  
 Advance directive discussed with patient Z71.89  
 Tremor R25.1  POP (obstructive sleep apnea) G47.33  
 Parkinson disease ([facility] [address] Amber Bassy Depression Risk Factor Screening: PHQ over the last two weeks 7/31/2018 Little interest or pleasure in doing things More than half the days Feeling down, depressed, irritable, or hopeless More than half the days Total Score PHQ 2 4 Alcohol Risk Factor Screening: You do not drink alcohol or very rarely. Functional Ability and Level of Safety:  
 
Hearing Loss Hearing is good. Activities of Daily Living The home contains: no safety equipment. Patient does total self care ADL Assessment 7/31/2018 Feeding yourself No Help Needed Getting from bed to chair No Help Needed Getting dressed No Help Needed Bathing or showering No Help Needed Walk across the room (includes cane/walker) No Help Needed Using the telphone No Help Needed Taking your medications No Help Needed Preparing meals No Help Needed Managing money (expenses/bills) No Help Needed Moderately strenuous housework (laundry) No Help Needed Shopping for personal items (toiletries/medicines) No Help Needed Shopping for groceries No Help Needed Driving No Help Needed Climbing a flight of stairs No Help Needed Getting to places beyond walking distances No Help Needed Fall Risk Fall Risk Assessment, last 12 mths 7/31/2018 Able to walk? Yes Fall in past 12 months? No  
Fall with injury? -  
Number of falls in past 12 months - Fall Risk Score -  
 
Abuse Screen Patient is not abused Cognitive Screening Evaluation of Cognitive Function: 
Has your family/caregiver stated any concerns about your memory: no 
 
Patient Care Team  
Patient Care Team: 
Oswaldo Mckinnon MD as PCP - General (Internal Medicine) Vern Tejada MD (Ophthalmology) Fern Renteria MD (Neurology) Micheal Gresham MD (Ophthalmology) Assessment/Plan Education and counseling provided: 
Are appropriate based on today's review and evaluation End-of-Life planning (with patient's consent) Pneumococcal Vaccine Influenza Vaccine Screening Mammography Colorectal cancer screening tests Cardiovascular screening blood test 
Bone mass measurement (DEXA) Screening for glaucoma Diabetes screening test  
Shingrix Tdap Diagnoses and all orders for this visit: 1. Essential hypertension -     METABOLIC PANEL, COMPREHENSIVE 
-     LIPID PANEL 
 
2. POP (obstructive sleep apnea) -     REFERRAL TO SLEEP STUDIES 3. Acquired hypothyroidism 
-     TSH RFX ON ABNORMAL TO FREE T4 
 
4. Parkinson disease ([facility] [address] 5. Borderline high cholesterol -     METABOLIC PANEL, COMPREHENSIVE 
-     LIPID PANEL 
 
6. Initial Medicare annual wellness visit 7. Screening for alcoholism 8. Screening for depression Other orders 
-     losartan-hydroCHLOROthiazide (HYZAAR) 100-25 mg per tablet;  Take 1 Tab by mouth daily. 
-     SYNTHROID 100 mcg tablet; TAKE ONE TABLET DAILY BEFORE BREAKFAST AND ONE AND ONE-HALF TABLETS ON SUNDAY 
-     varicella-zoster recombinant, PF, (SHINGRIX, PF,) 50 mcg/0.5 mL susr injection; 0.5mL by IntraMUSCular route once now and then repeat in 2-6 months 
-     diph,pertuss,acel,,tetanus vac,PF, (ADACEL) 2 Lf-(2.5-5-3-5 mcg)-5Lf/0.5 mL syrg vaccine; 0.5 mL by IntraMUSCular route once for 1 dose. 9.  Patient did not bring in their medications to the visit. During the patient interview,  the following was noted: 
Calcium/Vit D:  Not taking. Patient has been increasing the Ca in diet Vit D3:  Patient is taking 1000 units daily Losartan/HCTZ:  Patient is currently taking in the evening, recommended that she take it earlier in the day to see if that decreases her nocturia 10. Screenings (see patient instructions for chart): 
Mammogram: Up to date, due for repeat in September Colonoscopy: Due, patient given referral previously and will call to make appointment Glaucoma screening/Eye exam: Up to date DEXA scan: Up to date, completed 9/26/17 Lipid panel: Up to date, due for repeat in 4/2019 Diabetes: Up to date, due for repeat in 4/2019 11. Immunizations: 
Pneumococcal:  Completed Influenza:  Recommended that patient receive here or at their pharmacy. Zoster:  Patient received Zostavax; Recommended that patient receive a Shingrix series at their pharmacy. Tdap:  Recommended that patient receive at their pharmacy. 12. Advanced Care Planning:  Patient currently has advanced directive on file/Recommended that they set up an appointment with [facility] to discuss. Patient verbalized understanding of information presented. Answered all of the patient's questions. AVS handed and reviewed with patient. Chris Coronel, PharmD, [facility] Health Maintenance Due Topic Date Due  
 COLONOSCOPY  05/04/1967  
 DTaP/Tdap/Td series (1 - Tdap) 05/04/1970  MEDICARE YEARLY EXAM  07/18/2018  BREAST CANCER SCRN MAMMOGRAM  09/26/2018

## 2018-07-31 NOTE — MR AVS SNAPSHOT
[address] [place] [address] [address] [facility] [address] 
[phone] Patient: Tanya Mcneil MRN: [id number] ILP:[id number] Visit Information Date & Time Provider Department Dept. Phone Encounter #  
 7/31/2018 10:20 AM Oswaldo Mckinnon MD [facility] [phone] [id number] Upcoming Health Maintenance Date Due COLONOSCOPY 5/4/1967 DTaP/Tdap/Td series (1 - Tdap) 5/4/1970 MEDICARE YEARLY EXAM 7/18/2018 BREAST CANCER SCRN MAMMOGRAM 9/26/2018 Influenza Age 5 to Adult 8/1/2018 GLAUCOMA SCREENING Q2Y 10/27/2019 Allergies as of 7/31/2018  Review Complete On: 7/31/2018 By: Oswaldo Mckinnon MD  
  
 Severity Noted Reaction Type Reactions Latex  10/24/2011    Unknown (comments) Augmentin [Amoxicillin-pot Clavulanate]  01/07/2013    Diarrhea She can tolerate Amoxicillin Erythromycin  10/10/2011    Unknown (comments) Motrin [Ibuprofen]  10/10/2011    Swelling  
 legs Current Immunizations  Reviewed on 7/31/2018 Name Date Influenza Vaccine 10/15/2017, 11/12/2014, 10/10/2013 Influenza Vaccine Split 10/24/2011 11:52 AM  
 Pneumococcal Conjugate (PCV-13) 6/1/2016 Pneumococcal Polysaccharide (PPSV-23) 11/12/2014 Zoster Vaccine, Live 8/3/2015 Reviewed by Lucio Dickinson PHARMD on 7/31/2018 at 11:16 AM  
You Were Diagnosed With   
  
 Codes Comments Essential hypertension    -  Primary ICD-10-CM: I10 
ICD-9-CM: 401.9 POP (obstructive sleep apnea)     ICD-10-CM: G47.33 
ICD-9-CM: 327.23 Acquired hypothyroidism     ICD-10-CM: E03.9 ICD-9-CM: 244.9 Parkinson disease ([address]     ICD-10-CM: G20 
ICD-9-CM: 332.0 Borderline high cholesterol     ICD-10-CM: E78.9 ICD-9-CM: 272.9 Initial Medicare annual wellness visit     ICD-10-CM: Z00.00 ICD-9-CM: V70.0 Screening for alcoholism     ICD-10-CM: Z13.89 ICD-9-CM: V79.1 Screening for depression     ICD-10-CM: Z13.89 ICD-9-CM: V79.0 Vitals BP Pulse Temp Resp Height(growth percentile) Weight(growth percentile) 112/72 75 96.8 °F (36 °C) (Oral) 18 5' 5\" (1.651 m) 182 lb 12.8 oz (82.9 kg) SpO2 BMI OB Status Smoking Status 99% 30.42 kg/m2 Postmenopausal Never Smoker Vitals History BMI and BSA Data Body Mass Index Body Surface Area  
 30.42 kg/m 2 1.95 m 2 Preferred Pharmacy Pharmacy Name Phone [id number] Mindy Pfeiffer, [address] [place] [address] [phone] Your Updated Medication List  
  
   
This list is accurate as of 7/31/18 11:55 AM.  Always use your most recent med list.  
  
  
  
  
 carbidopa-levodopa  mg per tablet Commonly known as:  SINEMET  
TAKE 2 TAB BY MOUTH THREE TIMES A DAY FOLLOW DIRECTIONS GIVEN IN [facility]  
  
 diph,pertuss(acel),tetanus vac(PF) 2 Lf-(2.5-5-3-5 mcg)-5Lf/0.5 mL Syrg vaccine Commonly known as:  ADACEL  
0.5 mL by IntraMUSCular route once for 1 dose. losartan-hydroCHLOROthiazide 100-25 mg per tablet Commonly known as:  HYZAAR Take 1 Tab by mouth daily. prednisoLONE acetate 1 % ophthalmic suspension Commonly known as:  PRED FORTE INSTILL 1 DROP INTO AFFECTED EYES 4 TIMES X1 WK, 3 TIMES DAILY X1 WK, 2 TIMES X1 WK, 1 TIME X1 WEEK SYNTHROID 100 mcg tablet Generic drug:  levothyroxine TAKE ONE TABLET DAILY BEFORE BREAKFAST AND ONE AND ONE-HALF TABLETS ON SUNDAY  
  
 varicella-zoster recombinant (PF) 50 mcg/0.5 mL Susr injection Commonly known as:  SHINGRIX (PF)  
0.5mL by IntraMUSCular route once now and then repeat in 2-6 months VITAMIN D3 1,000 unit Cap Generic drug:  cholecalciferol Take 1,000 Units by mouth daily. Prescriptions Printed  Refills  
 varicella-zoster recombinant, PF, (SHINGRIX, PF,) 50 mcg/0.5 mL susr injection 1  
 Si.5mL by IntraMUSCular route once now and then repeat in 2-6 months Class: Print  
 diph,pertuss,acel,,tetanus vac,PF, (ADACEL) 2 Lf-(2.5-5-3-5 mcg)-5Lf/0.5 mL syrg vaccine 0 Si.5 mL by IntraMUSCular route once for 1 dose. Class: Print Route: IntraMUSCular Prescriptions Sent to Pharmacy Refills  
 losartan-hydroCHLOROthiazide (HYZAAR) 100-25 mg per tablet 3 Sig: Take 1 Tab by mouth daily. Class: Normal  
 Pharmacy: [facility] [address] [place] [address], [address] [place] [address] Ph #: [phone] Route: Oral  
 SYNTHROID 100 mcg tablet 3 Sig: TAKE ONE TABLET DAILY BEFORE BREAKFAST AND ONE AND ONE-HALF TABLETS ON  Class: Normal  
 Pharmacy: [facility]/pharmacy [address] [place] [address] Ph #: [phone] We Performed the Following LIPID PANEL [ CPT(R)] METABOLIC PANEL, COMPREHENSIVE [71543 CPT(R)] REFERRAL TO SLEEP STUDIES [REF99 Custom] TSH RFX ON ABNORMAL TO FREE T4 [[id number] Custom] Referral Information Referral ID Referred By Referred To  
  
 [id number] [address] [place] [address],[address], [place] [address], MD   
   [address] [place] [address] Phone: [phone] Fax: [phone] Visits Status Start Date End Date 1 New Request 18 If your referral has a status of pending review or denied, additional information will be sent to support the outcome of this decision. Patient Instructions Medicare Part B Preventive Services Limitations Recommendation Scheduled Bone Mass Measurement 
(age 72 & older, biennial) Requires diagnosis related to osteoporosis or estrogen deficiency.  Biennial benefit unless patient has history of long-term glucocorticoid tx or baseline is needed because initial test was by other method A DEXA scan is recommended after you turn 72years of age to determine your risk for osteoporosis Completed 9/26/17 Cardiovascular Screening Blood Tests Last: 4/4/18 Every Year Next: 4/2019 Colorectal Cancer Screening 
-Fecal occult blood test (annual) -Flexible sigmoidoscopy (5y) 
-Screening colonoscopy (10y) -Barium Enema  Every 5-10 years depending on your colonoscopy result starting at age 48 years Please make an appointment Diabetes Screening Tests (at least every 3 years, Medicare covers annually or at 6-month intervals for prediabetic patients) Fasting blood sugar (FBS) or glucose tolerance test (GTT) Patient must be diagnosed with one of the following: 
-Hypertension, Dyslipidemia, obesity, previous impaired FBS or GTT 
Or any two of the following: overweight, FH of diabetes, age ? 72, history of gestational diabetes, birth of baby weighing more than 9 pounds Last: 4/4/18 Next: 4/2019 or sooner with routine labs Seasonal Influenza Vaccination (annually)  Last:  
 
Every Fall Please get one this Fall. Shingles Vaccination  A shingrix vaccine series (2 shots  by 2-6 months) is recommended once in a lifetime after age 48  Please get one at your pharmacy. Screening Mammography (biennial age 54-69)? Annually (age 36 or over) Last: 9/26/17 Next: 9/2018 Medicare Wellness Visit, Female The best way to live healthy is to have a lifestyle where you eat a well-balanced diet, exercise regularly, limit alcohol use, and quit all forms of tobacco/nicotine, if applicable. Regular preventive services are another way to keep healthy. Preventive services (vaccines, screening tests, monitoring & exams) can help personalize your care plan, which helps you manage your own care. Screening tests can find health problems at the earliest stages, when they are easiest to treat.  
  
[facility] follows the current, evidence-based guidelines published by the [facility] Jonathan Alessandro (USPSTF) when recommending preventive services for our patients. Because we follow these guidelines, sometimes recommendations change over time as research supports it. (For example, mammograms used to be recommended annually. Even though Medicare will still pay for an annual mammogram, the newer guidelines recommend a mammogram every two years for women of average risk.) Of course, you and your provider may decide to screen more often for some diseases, based on your risk and co-morbidities (chronic disease you are already diagnosed with). Preventive services for you include: - Medicare offers their members a free annual wellness visit, which is time for you and your primary care provider to discuss and plan for your preventive service needs. Take advantage of this benefit every year! 
 
-All people over age 72 should receive the recommended pneumonia vaccines. Current USPSTF guidelines recommend a series of two vaccines for the best pneumonia protection.  
 
-All adults should have a yearly flu vaccine and a tetanus vaccine every 10 years. All adults age 61 years should receive a shingles vaccine once in their lifetime.   
 
-A bone mass density test is recommended when a woman turns 65 to screen for osteoporosis. This test is only recommended once as a screening. Some providers will use this same test as a disease monitoring tool if you already have osteoporosis.  
 
-All adults age 38-68 years who are overweight should have a diabetes screening test once every three years.  
 
-Other screening tests & preventive services for persons with diabetes include: an eye exam to screen for diabetic retinopathy, a kidney function test, a foot exam, and stricter control over your cholesterol.  
 
-Cardiovascular screening for adults with routine risk involves an electrocardiogram (ECG) at intervals determined by the provider.  
 
-Colorectal cancer screenings should be done for adults age 54-65 years with normal risk. There are a number of acceptable methods of screening for this type of cancer. Each test has its own benefits and drawbacks. Discuss with your provider what is most appropriate for you during your annual wellness visit. The different tests include: colonoscopy (considered the best screening method), a fecal occult blood test, a fecal DNA test, and sigmoidoscopy. -Breast cancer screenings are recommended every other year for women of normal risk age 54-69 years.  
 
-Cervical cancer screenings for women over age 72 are only recommended with certain risk factors.  
 
-All adults born between [facility] should be screened once for Hepatitis C. Here is a list of your current Health Maintenance items (your personalized list of preventive services) with a due date: 
Health Maintenance Due Topic Date Due  
 Colonoscopy  05/04/1967  
 DTaP/Tdap/Td  (1 - Tdap) 05/04/1970 [facility] Annual Well Visit  07/18/2018  Breast Cancer Screening  09/26/2018 Introducing [facility]! Marvin Betancourt introduces [facility] patient portal. Now you can access parts of your medical record, email your doctor's office, and request medication refills online. 1. In your internet browser, go to https://[employer]. [employer] 2. Click on the First Time User? Click Here link in the Sign In box. You will see the New Member Sign Up page. 3. Enter your [facility] Access Code exactly as it appears below. You will not need to use this code after youve completed the sign-up process. If you do not sign up before the expiration date, you must request a new code. · [facility] Access Code: [email or web address] Expires: 10/29/2018 11:55 AM 
 
4. Enter the last four digits of your Social Security Number (xxxx) and Date of Birth (mm/dd/yyyy) as indicated and click Submit. You will be taken to the next sign-up page. 5. Create a [facility] ID.  This will be your [facility] login ID and cannot be changed, so think of one that is secure and easy to remember. 6. Create a [employer] App password. You can change your password at any time. 7. Enter your Password Reset Question and Answer. This can be used at a later time if you forget your password. 8. Enter your e-mail address. You will receive e-mail notification when new information is available in [address] 9. Click Sign Up. You can now view and download portions of your medical record. 10. Click the Download Summary menu link to download a portable copy of your medical information. If you have questions, please visit the Frequently Asked Questions section of the [employer] website. Remember, [employer] App is NOT to be used for urgent needs. For medical emergencies, dial 911. Now available from your iPhone and Android! Please provide this summary of care documentation to your next provider. Your primary care clinician is listed as [facility] ENGLISH. If you have any questions after today's visit, please call [phone].

## 2018-07-31 NOTE — PATIENT INSTRUCTIONS
Medicare Part B Preventive Services Limitations Recommendation Scheduled Bone Mass Measurement 
(age 72 & older, biennial) Requires diagnosis related to osteoporosis or estrogen deficiency. Biennial benefit unless patient has history of long-term glucocorticoid tx or baseline is needed because initial test was by other method A DEXA scan is recommended after you turn 72years of age to determine your risk for osteoporosis Completed 9/26/17 Cardiovascular Screening Blood Tests Last: 4/4/18 Every Year Next: 4/2019 Colorectal Cancer Screening 
-Fecal occult blood test (annual) -Flexible sigmoidoscopy (5y) 
-Screening colonoscopy (10y) -Barium Enema  Every 5-10 years depending on your colonoscopy result starting at age 48 years Please make an appointment Diabetes Screening Tests (at least every 3 years, Medicare covers annually or at 6-month intervals for prediabetic patients) Fasting blood sugar (FBS) or glucose tolerance test (GTT) Patient must be diagnosed with one of the following: 
-Hypertension, Dyslipidemia, obesity, previous impaired FBS or GTT 
Or any two of the following: overweight, FH of diabetes, age ? 72, history of gestational diabetes, birth of baby weighing more than 9 pounds Last: 4/4/18 Next: 4/2019 or sooner with routine labs Seasonal Influenza Vaccination (annually)  Last:  
 
Every Fall Please get one this Fall. Shingles Vaccination  A shingrix vaccine series (2 shots  by 2-6 months) is recommended once in a lifetime after age 48  Please get one at your pharmacy. Screening Mammography (biennial age 54-69)? Annually (age 36 or over) Last: 9/26/17 Next: 9/2018 Medicare Wellness Visit, Female The best way to live healthy is to have a lifestyle where you eat a well-balanced diet, exercise regularly, limit alcohol use, and quit all forms of tobacco/nicotine, if applicable. Regular preventive services are another way to keep healthy.  Preventive services (vaccines, screening tests, monitoring & exams) can help personalize your care plan, which helps you manage your own care. Screening tests can find health problems at the earliest stages, when they are easiest to treat. [address] [facility] follows the current, evidence-based guidelines published by the [facility] Jonathan Francois ([facility]) when recommending preventive services for our patients. Because we follow these guidelines, sometimes recommendations change over time as research supports it. (For example, mammograms used to be recommended annually. Even though Medicare will still pay for an annual mammogram, the newer guidelines recommend a mammogram every two years for women of average risk.) Of course, you and your provider may decide to screen more often for some diseases, based on your risk and co-morbidities (chronic disease you are already diagnosed with). Preventive services for you include: - Medicare offers their members a free annual wellness visit, which is time for you and your primary care provider to discuss and plan for your preventive service needs. Take advantage of this benefit every year! 
 
-All people over age 72 should receive the recommended pneumonia vaccines. Current USPSTF guidelines recommend a series of two vaccines for the best pneumonia protection.  
 
-All adults should have a yearly flu vaccine and a tetanus vaccine every 10 years. All adults age 61 years should receive a shingles vaccine once in their lifetime.   
 
-A bone mass density test is recommended when a woman turns 65 to screen for osteoporosis. This test is only recommended once as a screening. Some providers will use this same test as a disease monitoring tool if you already have osteoporosis.  
 
-All adults age 38-68 years who are overweight should have a diabetes screening test once every three years.  
 
-Other screening tests & preventive services for persons with diabetes include: an eye exam to screen for diabetic retinopathy, a kidney function test, a foot exam, and stricter control over your cholesterol.  
 
-Cardiovascular screening for adults with routine risk involves an electrocardiogram (ECG) at intervals determined by the provider.  
 
-Colorectal cancer screenings should be done for adults age 54-65 years with normal risk. There are a number of acceptable methods of screening for this type of cancer. Each test has its own benefits and drawbacks. Discuss with your provider what is most appropriate for you during your annual wellness visit. The different tests include: colonoscopy (considered the best screening method), a fecal occult blood test, a fecal DNA test, and sigmoidoscopy. -Breast cancer screenings are recommended every other year for women of normal risk age 54-69 years.  
 
-Cervical cancer screenings for women over age 72 are only recommended with certain risk factors.  
 
-All adults born between [facility] should be screened once for Hepatitis C. Here is a list of your current Health Maintenance items (your personalized list of preventive services) with a due date: 
Health Maintenance Due Topic Date Due  
 Colonoscopy  05/04/1967  
 DTaP/Tdap/Td  (1 - Tdap) 05/04/1970 [facility] Annual Well Visit  07/18/2018  Breast Cancer Screening  09/26/2018

## 2019-04-17 ENCOUNTER — OFFICE VISIT (OUTPATIENT)
Dept: INTERNAL MEDICINE CLINIC | Age: 69
End: 2019-04-17

## 2019-04-17 ENCOUNTER — HOSPITAL ENCOUNTER (OUTPATIENT)
Dept: LAB | Age: 69
Discharge: HOME | End: 2019-04-17
Payer: MEDICARE

## 2019-04-17 VITALS
HEIGHT: 65 IN | WEIGHT: 162.2 LBS | SYSTOLIC BLOOD PRESSURE: 123 MMHG | OXYGEN SATURATION: 94 % | HEART RATE: 69 BPM | DIASTOLIC BLOOD PRESSURE: 66 MMHG | BODY MASS INDEX: 27.02 KG/M2 | TEMPERATURE: 98.6 F

## 2019-04-17 DIAGNOSIS — Z12.11 SCREEN FOR COLON CANCER: ICD-10-CM

## 2019-04-17 DIAGNOSIS — G20 PARKINSON DISEASE (HCC): ICD-10-CM

## 2019-04-17 DIAGNOSIS — E03.9 ACQUIRED HYPOTHYROIDISM: ICD-10-CM

## 2019-04-17 DIAGNOSIS — F33.1 MODERATE EPISODE OF RECURRENT MAJOR DEPRESSIVE DISORDER (HCC): ICD-10-CM

## 2019-04-17 DIAGNOSIS — I10 ESSENTIAL HYPERTENSION: Primary | ICD-10-CM

## 2019-04-17 DIAGNOSIS — E66.3 OVERWEIGHT (BMI 25.0-29.9): ICD-10-CM

## 2019-04-17 DIAGNOSIS — G47.33 OSA (OBSTRUCTIVE SLEEP APNEA): ICD-10-CM

## 2019-04-17 DIAGNOSIS — Z12.39 SCREENING FOR BREAST CANCER: ICD-10-CM

## 2019-04-17 PROCEDURE — 84439 ASSAY OF FREE THYROXINE: CPT

## 2019-04-17 PROCEDURE — 36415 COLL VENOUS BLD VENIPUNCTURE: CPT

## 2019-04-17 PROCEDURE — 84443 ASSAY THYROID STIM HORMONE: CPT

## 2019-04-17 PROCEDURE — 80048 BASIC METABOLIC PNL TOTAL CA: CPT

## 2019-04-17 RX ORDER — SERTRALINE HYDROCHLORIDE 25 MG/1
TABLET, FILM COATED ORAL
Refills: 3 | COMMUNITY
Start: 2019-03-18 | End: 2019-05-31 | Stop reason: DRUGHIGH

## 2019-04-17 NOTE — PROGRESS NOTES
Chief Complaint Patient presents with  Follow-up  
  blood pressure check Visit Vitals /66 Pulse 69 Temp 98.6 °F (37 °C) (Oral) Ht 5' 5\" (1.651 m) Wt 162 lb 3.2 oz (73.6 kg) SpO2 94% BMI 26.99 kg/m² 1. Have you been to the ER, urgent care clinic since your last visit? Hospitalized since your last visit? No 
 
2. Have you seen or consulted any other health care providers outside of the [address] [place] [address] since your last visit? Include any pap smears or colon screening.  no

## 2019-04-17 NOTE — PROGRESS NOTES
Subjective:  
 
Yosvany Martinez is a 70 y.o. female who presents for follow up of hypertension. Blood pressure was low yesterday at PT see below. Checked this am and blood pressure was 120/70. She has not been told in recent past that her blood pressure was low. Diet and Lifestyle: generally follows a low sodium diet Home BP Monitoring: is well controlled at home - see above. Cardiovascular ROS: taking medications as instructed, no medication side effects noted, no TIA's, no chest pain on exertion, no dyspnea on exertion, noting swelling of ankles, no palpitations. Rare lightheadedness when moving from sitting to standing. New concerns:  
Parkinsons disease - fell x2 in December and x1 January. Otto Fernandezderick Restarted PT last week. PT checked BP yesterday and was low. 90/60s. She was symptomatic rising from a sitting to standing position. She will have episodes with eyes flashing red or gold. Has to sit down and rest and then feels better. Once every couple of weeks. Other episodes will feel weak and tired and need to sit down - will fall asleep whenever sits down. Has POP but cannot tolerate mask She has not been eating as well. Appetite has decreased. Weight is down 20 lbs.  had MI and CVA this winter - taking care of him. Current Outpatient Medications Medication Sig Dispense Refill  sertraline (ZOLOFT) 25 mg tablet TAKE 1 TABLET BY MOUTH EVERY DAY  3  
 carbidopa-levodopa (SINEMET)  mg per tablet TAKE 2 TAB BY MOUTH THREE TIMES A DAY FOLLOW DIRECTIONS GIVEN IN [facility]  3  
 losartan-hydroCHLOROthiazide (HYZAAR) 100-25 mg per tablet Take 1 Tab by mouth daily. 90 Tab 3  
 SYNTHROID 100 mcg tablet TAKE ONE TABLET DAILY BEFORE BREAKFAST AND ONE AND ONE-HALF TABLETS ON SUNDAY 105 Tab 3  cholecalciferol (VITAMIN D3) 1,000 unit cap Take 1,000 Units by mouth daily. Lab Results Component Value Date/Time  GFR est non-AA 53 (L) 07/24/2017 01:10 PM  
 GFR est AA 61 07/24/2017 01:10 PM  
 Creatinine 1.08 (H) 07/24/2017 01:10 PM  
 BUN 23 07/24/2017 01:10 PM  
 Sodium 139 07/24/2017 01:10 PM  
 Potassium 3.9 07/24/2017 01:10 PM  
 Chloride 99 07/24/2017 01:10 PM  
 CO2 25 07/24/2017 01:10 PM  
 
Lab Results Component Value Date/Time TSH 3.860 07/24/2017 01:10 PM  
 T4, Free 1.63 07/24/2017 01:10 PM  
   
 
Review of Systems, additional: 
Pertinent items are noted in HPI. Objective:  
 
Visit Vitals /66 Pulse 69 Temp 98.6 °F (37 °C) (Oral) Ht 5' 5\" (1.651 m) Wt 162 lb 3.2 oz (73.6 kg) SpO2 94% BMI 26.99 kg/m² Appearance: alert, well appearing, and in no distress and oriented to person, place, and time. General exam: NECK: supple, no lad, no bruit, no tm LUNGS: cta bilat CV rrr, no m/g/r ABD: soft, nt, nd, nabs EXT: no c/c/e. Assessment/Plan:  
 
hypertension ? Control. Well controlled here but ? Periods of hypotension. Will have her check at home. Discussed proper BP cuff use. Continue same medications for now Check labs Parkinson's disease - may be contributing to some orthostatic hypotension. Continue same meds and PT 
 
POP - diagnosed many years ago and not being treated. Referred back to sleep MD as this may be contributing to many of her symptoms. .hypothyroid - ? Control. Repeat labs Continue same meds Depression - contributing to control of all of the above. Husbands ill health has been contributing as well. Discussed increasing Zoloft but pt declined at this time. Will discuss again at next visit. Overweight- has lost 20 lbs recently. Discussed good eating habits. HM _ referred for colonoscopy and mammogram.   
 
.  
Orders Placed This Encounter  [facility] MAMMO BI SCREENING INCL CAD  METABOLIC PANEL, BASIC  TSH 3RD GENERATION  
 T4, FREE  
 REFERRAL TO SLEEP STUDIES  Ziggy Sexton [facility]  sertraline (ZOLOFT) 25 mg tablet Follow-up and Dispositions · Return in about 1 month (around 5/15/2019) for htn.

## 2019-04-18 ENCOUNTER — TELEPHONE (OUTPATIENT)
Dept: INTERNAL MEDICINE CLINIC | Age: 69
End: 2019-04-18

## 2019-04-18 LAB
BUN SERPL-MCNC: 23 MG/DL (ref 8–27)
BUN/CREAT SERPL: 23 (ref 12–28)
CALCIUM SERPL-MCNC: 9.9 MG/DL (ref 8.7–10.3)
CHLORIDE SERPL-SCNC: 97 MMOL/L (ref 96–106)
CO2 SERPL-SCNC: 26 MMOL/L (ref 20–29)
CREAT SERPL-MCNC: 0.99 MG/DL (ref 0.57–1)
GLUCOSE SERPL-MCNC: 89 MG/DL (ref 65–99)
INTERPRETATION: NORMAL
POTASSIUM SERPL-SCNC: 4 MMOL/L (ref 3.5–5.2)
SODIUM SERPL-SCNC: 139 MMOL/L (ref 134–144)
T4 FREE SERPL-MCNC: 1.8 NG/DL (ref 0.82–1.77)
TSH SERPL DL<=0.005 MIU/L-ACNC: 0.35 UIU/ML (ref 0.45–4.5)

## 2019-04-18 NOTE — TELEPHONE ENCOUNTER
----- Message from Kip Lino MD sent at 4/18/2019  2:23 PM EDT -----  Please let her know she is receiving too much thyroid medication with TSH suppressed and free T4 elevated. Currently taking levothyroxine 100mg daily with 1.5 tabs on Sunday. Please have her decrease to just 100mg every day.   The rest of her labs were normal.

## 2019-04-18 NOTE — PROGRESS NOTES
Please let her know she is receiving too much thyroid medication with TSH suppressed and free T4 elevated. Currently taking levothyroxine 100mg daily with 1.5 tabs on Sunday. Please have her decrease to just 100mg every day.   The rest of her labs were normal.

## 2019-04-23 NOTE — TELEPHONE ENCOUNTER
2nd message left on mobile number. Attempt to contact home number; phone line disconnected. Dr. Haroon Botello can we please send lab letter to home address?

## 2019-05-01 ENCOUNTER — TELEPHONE (OUTPATIENT)
Dept: INTERNAL MEDICINE CLINIC | Age: 69
End: 2019-05-01

## 2019-05-01 NOTE — TELEPHONE ENCOUNTER
Blood pressure readings at PT on 4/26 was 90/70s. At home on 4/27 had 113/70, 4/25 121/69, 4/23 88/57. Currently on Losartan/hctz 100/25. Will have her cut in half and take 1/2 tab daily.     Has f/u here on 5/16

## 2019-05-31 ENCOUNTER — OFFICE VISIT (OUTPATIENT)
Dept: INTERNAL MEDICINE CLINIC | Age: 69
End: 2019-05-31

## 2019-05-31 VITALS
SYSTOLIC BLOOD PRESSURE: 128 MMHG | HEIGHT: 65 IN | HEART RATE: 65 BPM | BODY MASS INDEX: 26.99 KG/M2 | TEMPERATURE: 98.9 F | OXYGEN SATURATION: 99 % | DIASTOLIC BLOOD PRESSURE: 72 MMHG | WEIGHT: 162 LBS

## 2019-05-31 DIAGNOSIS — F33.0 MILD EPISODE OF RECURRENT MAJOR DEPRESSIVE DISORDER (HCC): ICD-10-CM

## 2019-05-31 DIAGNOSIS — I10 ESSENTIAL HYPERTENSION: ICD-10-CM

## 2019-05-31 DIAGNOSIS — G20 PARKINSON DISEASE (HCC): ICD-10-CM

## 2019-05-31 DIAGNOSIS — Z12.11 SCREEN FOR COLON CANCER: Primary | ICD-10-CM

## 2019-05-31 DIAGNOSIS — E03.9 ACQUIRED HYPOTHYROIDISM: ICD-10-CM

## 2019-05-31 RX ORDER — SERTRALINE HYDROCHLORIDE 50 MG/1
50 TABLET, FILM COATED ORAL DAILY
Qty: 30 TAB | Refills: 11 | Status: SHIPPED | OUTPATIENT
Start: 2019-05-31 | End: 2020-09-21

## 2019-05-31 RX ORDER — LOSARTAN POTASSIUM AND HYDROCHLOROTHIAZIDE 12.5; 5 MG/1; MG/1
1 TABLET ORAL DAILY
Qty: 30 TAB | Refills: 11 | Status: SHIPPED | OUTPATIENT
Start: 2019-05-31 | End: 2020-05-21

## 2019-05-31 NOTE — PROGRESS NOTES
Here forHISTORY OF PRESENT ILLNESS  Yumiko Lacy is a 78 y.o. female. Here for f/u from 1 month ago. BP had been running low at home but well controlled in office. She has been checking at home, running 130/70's but had 1 episode last night when feeling nauseous and bp was low 80/50. We cut back on thyroid medicaiton after TSH was suppressed and free T4 elevated. Remains tired but has upcoming POP testing scheduled. Had mammogram last month. Having episodes when food getting stuck in throat. Had severe issues many years ago but unsure how treated. Sometimes feels like food backs up but no vomiting. Not with the same foods. Occurring approx 1 time a month. Still difficult at home taking care of  who has been sick. Current Outpatient Medications:     sertraline (ZOLOFT) 25 mg tablet, TAKE 1 TABLET BY MOUTH EVERY DAY, Disp: , Rfl: 3    carbidopa-levodopa (SINEMET)  mg per tablet, TAKE 2 TAB BY MOUTH THREE TIMES A DAY FOLLOW DIRECTIONS GIVEN IN [facility], Disp: , Rfl: 3    losartan-hydroCHLOROthiazide (HYZAAR) 100-25 mg per tablet, Take 1 Tab by mouth daily. (Patient taking differently: Take 1 Tab by mouth daily. 1/2 tablet by mouth daily), Disp: 90 Tab, Rfl: 3    SYNTHROID 100 mcg tablet, TAKE ONE TABLET DAILY BEFORE BREAKFAST AND ONE AND ONE-HALF TABLETS ON SUNDAY (Patient taking differently: 1 tablet by mouth daily), Disp: 105 Tab, Rfl: 3    cholecalciferol (VITAMIN D3) 1,000 unit cap, Take 1,000 Units by mouth daily. , Disp: , Rfl:     Visit Vitals  /72   Pulse 65   Temp 98.9 °F (37.2 °C) (Oral)   Ht 5' 5\" (1.651 m)   Wt 162 lb (73.5 kg)   SpO2 99%   BMI 26.96 kg/m²       ROS  See above  Physical Exam   Constitutional: She appears well-developed and well-nourished. HENT:   Head: Normocephalic and atraumatic. Neck: Neck supple. No thyromegaly present. Cardiovascular: Normal rate, regular rhythm and normal heart sounds.  Exam reveals no gallop and no friction rub.   No murmur heard. Pulmonary/Chest: Effort normal and breath sounds normal.   Abdominal: Soft. Bowel sounds are normal. She exhibits no distension and no mass. There is no tenderness. Musculoskeletal: She exhibits no edema. Lymphadenopathy:     She has no cervical adenopathy. Vitals reviewed. ASSESSMENT and PLAN  htn - bp better controlled but still with intermittent lows. Continue same medication  Hypothyroid - recent decrease in dose. Will repeat in 1 month  HM - would like cologuard instead of coloscopy. Has had mammogram.    depression - increase zoloft to 50mg every day  Fatigue - repeat POP. Swallowing issues - we will watch this. If worsens to call. Orders Placed This Encounter    COLOGUARD TEST (FECAL DNA COLORECTAL CANCER SCREENING)    TSH RFX ON ABNORMAL TO FREE T4    sertraline (ZOLOFT) 50 mg tablet    losartan-hydroCHLOROthiazide (HYZAAR) 50-12.5 mg per tablet     Follow-up and Dispositions    · Return in about 6 months (around 11/30/2019) for htn, hypothyroid.

## 2019-08-18 RX ORDER — LEVOTHYROXINE SODIUM 100 UG/1
TABLET ORAL
Qty: 105 TAB | Refills: 3 | Status: SHIPPED | OUTPATIENT
Start: 2019-08-18 | End: 2020-09-01

## 2020-03-19 ENCOUNTER — HOSPITAL ENCOUNTER (OUTPATIENT)
Dept: LAB | Age: 70
Discharge: HOME | End: 2020-03-19

## 2020-03-19 ENCOUNTER — OFFICE VISIT (OUTPATIENT)
Dept: INTERNAL MEDICINE CLINIC | Age: 70
End: 2020-03-19

## 2020-03-19 VITALS
DIASTOLIC BLOOD PRESSURE: 82 MMHG | TEMPERATURE: 97.5 F | HEART RATE: 76 BPM | RESPIRATION RATE: 18 BRPM | WEIGHT: 166 LBS | BODY MASS INDEX: 27.66 KG/M2 | HEIGHT: 65 IN | OXYGEN SATURATION: 98 % | SYSTOLIC BLOOD PRESSURE: 136 MMHG

## 2020-03-19 DIAGNOSIS — E03.9 ACQUIRED HYPOTHYROIDISM: ICD-10-CM

## 2020-03-19 DIAGNOSIS — E78.9 BORDERLINE HIGH CHOLESTEROL: ICD-10-CM

## 2020-03-19 DIAGNOSIS — I10 ESSENTIAL HYPERTENSION: ICD-10-CM

## 2020-03-19 DIAGNOSIS — Z71.89 ADVANCE DIRECTIVE DISCUSSED WITH PATIENT: ICD-10-CM

## 2020-03-19 DIAGNOSIS — Z12.31 ENCOUNTER FOR SCREENING MAMMOGRAM FOR MALIGNANT NEOPLASM OF BREAST: ICD-10-CM

## 2020-03-19 DIAGNOSIS — Z00.00 MEDICARE ANNUAL WELLNESS VISIT, SUBSEQUENT: ICD-10-CM

## 2020-03-19 DIAGNOSIS — F33.0 MILD EPISODE OF RECURRENT MAJOR DEPRESSIVE DISORDER (HCC): ICD-10-CM

## 2020-03-19 DIAGNOSIS — G20 PARKINSON DISEASE (HCC): ICD-10-CM

## 2020-03-19 DIAGNOSIS — E66.3 OVERWEIGHT (BMI 25.0-29.9): ICD-10-CM

## 2020-03-19 DIAGNOSIS — I10 ESSENTIAL HYPERTENSION: Primary | ICD-10-CM

## 2020-03-19 LAB
ALBUMIN SERPL-MCNC: 4 G/DL (ref 3.5–5)
ALBUMIN/GLOB SERPL: 1.3 {RATIO} (ref 1.1–2.2)
ALP SERPL-CCNC: 83 U/L (ref 45–117)
ALT SERPL-CCNC: 10 U/L (ref 12–78)
ANION GAP SERPL CALC-SCNC: 2 MMOL/L (ref 5–15)
AST SERPL-CCNC: 13 U/L (ref 15–37)
BILIRUB SERPL-MCNC: 0.7 MG/DL (ref 0.2–1)
BUN SERPL-MCNC: 30 MG/DL (ref 6–20)
BUN/CREAT SERPL: 28 (ref 12–20)
CALCIUM SERPL-MCNC: 9.9 MG/DL (ref 8.5–10.1)
CHLORIDE SERPL-SCNC: 105 MMOL/L (ref 97–108)
CHOLEST SERPL-MCNC: 188 MG/DL
CO2 SERPL-SCNC: 31 MMOL/L (ref 21–32)
CREAT SERPL-MCNC: 1.06 MG/DL (ref 0.55–1.02)
GLOBULIN SER CALC-MCNC: 3.2 G/DL (ref 2–4)
GLUCOSE SERPL-MCNC: 91 MG/DL (ref 65–100)
HDLC SERPL-MCNC: 62 MG/DL
HDLC SERPL: 3 {RATIO} (ref 0–5)
LDLC SERPL CALC-MCNC: 102.6 MG/DL (ref 0–100)
LIPID PROFILE,FLP: ABNORMAL
POTASSIUM SERPL-SCNC: 4.2 MMOL/L (ref 3.5–5.1)
PROT SERPL-MCNC: 7.2 G/DL (ref 6.4–8.2)
SODIUM SERPL-SCNC: 138 MMOL/L (ref 136–145)
T4 FREE SERPL-MCNC: 1.4 NG/DL (ref 0.8–1.5)
TRIGL SERPL-MCNC: 117 MG/DL (ref ?–150)
TSH SERPL DL<=0.05 MIU/L-ACNC: 1.85 UIU/ML (ref 0.36–3.74)
VLDLC SERPL CALC-MCNC: 23.4 MG/DL

## 2020-03-19 RX ORDER — MELOXICAM 15 MG/1
15 TABLET ORAL DAILY
COMMUNITY
End: 2021-03-31 | Stop reason: ALTCHOICE

## 2020-03-19 RX ORDER — ZOSTER VACCINE RECOMBINANT, ADJUVANTED 50 MCG/0.5
KIT INTRAMUSCULAR
Qty: 0.5 ML | Refills: 1 | Status: SHIPPED | OUTPATIENT
Start: 2020-03-19 | End: 2020-10-01 | Stop reason: ALTCHOICE

## 2020-03-19 NOTE — PROGRESS NOTES
1. Have you been to the ER, urgent care clinic since your last visit? Hospitalized since your last visit? No    2. Have you seen or consulted any other health care providers outside of the [address] [place] [address] since your last visit? Include any pap smears or colon screening.  No

## 2020-03-19 NOTE — PROGRESS NOTES
She is here for a L TKR on 4/2 with Dr. Ildefonso Aaron at [address] 79yo mother lives with her.  has had heart surgery and multiple strokes.  wants her to have surgery at this time. HTN _ does not check at home. Taking her losartan-hctz daily. Deneis chest pain, sob, le edema, palpitations or dizziness. Parkinson - trying to exercise. Taking her medication regularly. UTD with Dr. Esvin Orozco on Sinemet. Hypothyroidism - feels euthyroid. No recent changes in weight, energy level or appetite. Feels certain foods get stuck in chest.  Occurring 1-2 times a week. Not with certain foods. Wonders if this is secondary to stress. Current Outpatient Medications:     SYNTHROID 100 mcg tablet, TAKE 1 TABLET BY MOUTH EVERY DAY BEFORE BREAKFAST. TAKE 1 AND 1/2 TABS ON SUNDAYS., Disp: 105 Tab, Rfl: 3    sertraline (ZOLOFT) 50 mg tablet, Take 1 Tab by mouth daily. , Disp: 30 Tab, Rfl: 11    losartan-hydroCHLOROthiazide (HYZAAR) 50-12.5 mg per tablet, Take 1 Tab by mouth daily. Note new dose, Disp: 30 Tab, Rfl: 11    carbidopa-levodopa (SINEMET)  mg per tablet, TAKE 2 TAB BY MOUTH THREE TIMES A DAY FOLLOW DIRECTIONS GIVEN IN [facility], Disp: , Rfl: 3    cholecalciferol (VITAMIN D3) 1,000 unit cap, Take 1,000 Units by mouth daily. , Disp: , Rfl:     Visit Vitals  /82 (BP 1 Location: Right arm, BP Patient Position: Sitting)   Pulse 76   Temp 97.5 °F (36.4 °C) (Oral)   Resp 18   Ht 5' 5\" (1.651 m)   Wt 166 lb (75.3 kg)   SpO2 98%   BMI 27.62 kg/m²     PE:  NECK: supple, no lad, no bruit, no tm  LUNGS: cta bilat  CV rrr, no m/g/r  ABD: soft, nt, nd, nabs  EXT: no c/c/e    A/P:  htn - controlled, cont same  parkinsons - controlled, cont same  Hypothyroidism - controlled, cont same  L knee end stage OA - going to defer surgery until this summer secondary to [address].   She will contact Dr. Ildefonso Aaron to delay surgery,      This is the Subsequent Medicare Annual Wellness Exam, performed 12 months or more after the Initial AWV or the last Subsequent AWV    I have reviewed the patient's medical history in detail and updated the computerized patient record. History     Patient Active Problem List   Diagnosis Code    IBS (irritable bowel syndrome) K58.9    Fibrocystic breast N60.19    HTN (hypertension) I10    Hypothyroid E03.9    Obesity (BMI 30.0-34. 9) E66.9    OAB (overactive bladder) N32.81    Advance directive discussed with patient Z71.89    Tremor R25.1    POP (obstructive sleep apnea) G47.33    Parkinson disease ([address] G20    Parkinson's disease ([address] G20     Past Medical History:   Diagnosis Date    Fibrocystic breast disease     Hypertension     Hypothyroid 2/1998    s/p I131 treatment - [place]    IBS (irritable bowel syndrome)     POP on CPAP     4mm    Parkinson's disease ([address]     Pilonidal cyst 1968      Past Surgical History:   Procedure Laterality Date    BREAST SURGERY PROCEDURE UNLISTED  10/09    benign lumpectomy - left    HX APPENDECTOMY  1960    HX CYST REMOVAL      HX DILATION AND CURETTAGE  11/00    HX GYN  9139,1840    exp laparoscopy for endometriosis    HX KNEE REPLACEMENT  9/10/10    right    HX ORTHOPAEDIC      r knee arthroscopy    HX ORTHOPAEDIC  12/01    left thumb and forearm surg     Current Outpatient Medications   Medication Sig Dispense Refill    meloxicam (MOBIC) 15 mg tablet Take 15 mg by mouth daily.  SYNTHROID 100 mcg tablet TAKE 1 TABLET BY MOUTH EVERY DAY BEFORE BREAKFAST. TAKE 1 AND 1/2 TABS ON SUNDAYS. 105 Tab 3    sertraline (ZOLOFT) 50 mg tablet Take 1 Tab by mouth daily. 30 Tab 11    losartan-hydroCHLOROthiazide (HYZAAR) 50-12.5 mg per tablet Take 1 Tab by mouth daily. Note new dose 30 Tab 11    carbidopa-levodopa (SINEMET)  mg per tablet TAKE 2 TAB BY MOUTH THREE TIMES A DAY FOLLOW DIRECTIONS GIVEN IN [facility]  3    cholecalciferol (VITAMIN D3) 1,000 unit cap Take 1,000 Units by mouth daily.        Allergies   Allergen Reactions    Latex Unknown (comments)    Augmentin [Amoxicillin-Pot Clavulanate] Diarrhea     She can tolerate Amoxicillin    Erythromycin Unknown (comments)    Motrin [Ibuprofen] Swelling     legs       Family History   Problem Relation Age of Onset    Hypertension Mother     Heart Attack Mother 80    COPD Father     Dementia Father         alzeihmers    Other Father         PUD     Social History     Tobacco Use    Smoking status: Never Smoker    Smokeless tobacco: Never Used   Substance Use Topics    Alcohol use: No     Alcohol/week: 0.0 standard drinks       Depression Risk Factor Screening:     3 most recent PHQ Screens 3/19/2020   Little interest or pleasure in doing things Several days   Feeling down, depressed, irritable, or hopeless Several days   Total Score PHQ 2 2       Alcohol Risk Factor Screening:   Do you average 1 drink per night or more than 7 drinks a week:  No    On any one occasion in the past three months have you have had more than 3 drinks containing alcohol:  No      Functional Ability and Level of Safety:   Hearing: mild hearing loss. pt declines hearing testing referral    Activities of Daily Living: The home contains: handrails and grab bars  Patient does total self care    Ambulation: with difficulty, uses a cane . Secondary to knee arthritis and parkinsons. Fall Risk:  Fall Risk Assessment, last 12 mths 5/31/2019   Able to walk? Yes   Fall in past 12 months?  No   Fall with injury? -   Number of falls in past 12 months -   Fall Risk Score -       Abuse Screen:  Patient is not abused    Cognitive Screening   Has your family/caregiver stated any concerns about your memory: no  Cognitive Screening: Normal -      Patient Care Team   Patient Care Team:  Austyn Holley MD as PCP - General (Internal Medicine)  Austyn Holley MD as PCP - [facility] Empaneled Provider  Aman Griggs MD (Ophthalmology)  Gloria Walker MD (Neurology)  Vic Murray MD (Ophthalmology)    Assessment/Plan   Education and counseling provided:  Are appropriate based on today's review and evaluation   Advanced directive discussed with patient. Diagnoses and all orders for this visit:    1. Essential hypertension    2. Parkinson disease ([facility]    3. Mild episode of recurrent major depressive disorder ([facility]    4. Overweight (BMI 25.0-29.9)    5.  Acquired hypothyroidism        Health Maintenance Due   Topic Date Due    Cologuard 07/09/2022    DTaP/Tdap/Td series (1 - Tdap) Prescription given    Shingrix Vaccine Age 49> (1 of 2) Prescription given    Medicare Yearly Exam  03/19/2021    Influenza Age 5 to Adult  Up to date    Breast Cancer Screen Mammogram  Ordered today    GLAUCOMA SCREENING Q2Y  Up to date

## 2020-03-19 NOTE — PATIENT INSTRUCTIONS
Medicare Wellness Visit, Female The best way to live healthy is to have a lifestyle where you eat a well-balanced diet, exercise regularly, limit alcohol use, and quit all forms of tobacco/nicotine, if applicable. Regular preventive services are another way to keep healthy. Preventive services (vaccines, screening tests, monitoring & exams) can help personalize your care plan, which helps you manage your own care. Screening tests can find health problems at the earliest stages, when they are easiest to treat. [facility] follows the current, evidence-based guidelines published by the [facility] Jonathan Francois ([facility]) when recommending preventive services for our patients. Because we follow these guidelines, sometimes recommendations change over time as research supports it. (For example, mammograms used to be recommended annually. Even though Medicare will still pay for an annual mammogram, the newer guidelines recommend a mammogram every two years for women of average risk). Of course, you and your doctor may decide to screen more often for some diseases, based on your risk and your co-morbidities (chronic disease you are already diagnosed with). Preventive services for you include: - Medicare offers their members a free annual wellness visit, which is time for you and your primary care provider to discuss and plan for your preventive service needs. Take advantage of this benefit every year! 
-All adults over the age of 72 should receive the recommended pneumonia vaccines. Current USPSTF guidelines recommend a series of two vaccines for the best pneumonia protection.  
-All adults should have a flu vaccine yearly and a tetanus vaccine every 10 years.  
-All adults age 48 and older should receive the shingles vaccines (series of two vaccines). -All adults age 38-68 who are overweight should have a diabetes screening test once every three years. -All adults born between [birth year] and 1966 should be screened once for Hepatitis C. 
-Other screening tests and preventive services for persons with diabetes include: an eye exam to screen for diabetic retinopathy, a kidney function test, a foot exam, and stricter control over your cholesterol.  
-Cardiovascular screening for adults with routine risk involves an electrocardiogram (ECG) at intervals determined by your doctor.  
-Colorectal cancer screenings should be done for adults age 54-65 with no increased risk factors for colorectal cancer. There are a number of acceptable methods of screening for this type of cancer. Each test has its own benefits and drawbacks. Discuss with your doctor what is most appropriate for you during your annual wellness visit. The different tests include: colonoscopy (considered the best screening method), a fecal occult blood test, a fecal DNA test, and sigmoidoscopy. 
 
-A bone mass density test is recommended when a woman turns 65 to screen for osteoporosis. This test is only recommended one time, as a screening. Some providers will use this same test as a disease monitoring tool if you already have osteoporosis. -Breast cancer screenings are recommended every other year for women of normal risk, age 54-69. 
-Cervical cancer screenings for women over age 72 are only recommended with certain risk factors. Here is a list of your current Health Maintenance items (your personalized list of preventive services) with a due date: 
 
 
 
Health Maintenance Due Topic Date Due  
 Cologuard 07/09/2022  DTaP/Tdap/Td series (1 - Tdap) Prescription given  Shingrix Vaccine Age 50> (1 of 2) Prescription given  Medicare Yearly Exam  03/19/2021  Influenza Age 5 to Adult  Up to date  Breast Cancer Screen Mammogram  Ordered today  GLAUCOMA SCREENING Q2Y  Up to date

## 2020-05-21 RX ORDER — LOSARTAN POTASSIUM AND HYDROCHLOROTHIAZIDE 12.5; 5 MG/1; MG/1
1 TABLET ORAL DAILY
Qty: 90 TAB | Refills: 3 | Status: SHIPPED | OUTPATIENT
Start: 2020-05-21 | End: 2021-05-16

## 2020-09-01 RX ORDER — LEVOTHYROXINE SODIUM 100 UG/1
TABLET ORAL
Qty: 35 TAB | Refills: 11 | Status: SHIPPED | OUTPATIENT
Start: 2020-09-01 | End: 2021-04-08 | Stop reason: SDUPTHER

## 2020-10-01 ENCOUNTER — OFFICE VISIT (OUTPATIENT)
Dept: INTERNAL MEDICINE CLINIC | Age: 70
End: 2020-10-01
Payer: MEDICARE

## 2020-10-01 VITALS
DIASTOLIC BLOOD PRESSURE: 87 MMHG | RESPIRATION RATE: 16 BRPM | BODY MASS INDEX: 26.69 KG/M2 | TEMPERATURE: 95.4 F | OXYGEN SATURATION: 100 % | HEIGHT: 65 IN | WEIGHT: 160.2 LBS | SYSTOLIC BLOOD PRESSURE: 148 MMHG | HEART RATE: 71 BPM

## 2020-10-01 DIAGNOSIS — Z01.818 PREOP EXAMINATION: ICD-10-CM

## 2020-10-01 DIAGNOSIS — H25.9 AGE-RELATED CATARACT OF BOTH EYES, UNSPECIFIED AGE-RELATED CATARACT TYPE: Primary | ICD-10-CM

## 2020-10-01 DIAGNOSIS — I10 ESSENTIAL HYPERTENSION: ICD-10-CM

## 2020-10-01 DIAGNOSIS — G20 PARKINSON DISEASE (HCC): ICD-10-CM

## 2020-10-01 PROCEDURE — G8510 SCR DEP NEG, NO PLAN REQD: HCPCS | Performed by: INTERNAL MEDICINE

## 2020-10-01 PROCEDURE — G8754 DIAS BP LESS 90: HCPCS | Performed by: INTERNAL MEDICINE

## 2020-10-01 PROCEDURE — G8753 SYS BP > OR = 140: HCPCS | Performed by: INTERNAL MEDICINE

## 2020-10-01 PROCEDURE — G8536 NO DOC ELDER MAL SCRN: HCPCS | Performed by: INTERNAL MEDICINE

## 2020-10-01 PROCEDURE — G8399 PT W/DXA RESULTS DOCUMENT: HCPCS | Performed by: INTERNAL MEDICINE

## 2020-10-01 PROCEDURE — G8419 CALC BMI OUT NRM PARAM NOF/U: HCPCS | Performed by: INTERNAL MEDICINE

## 2020-10-01 PROCEDURE — 1090F PRES/ABSN URINE INCON ASSESS: CPT | Performed by: INTERNAL MEDICINE

## 2020-10-01 PROCEDURE — G9899 SCRN MAM PERF RSLTS DOC: HCPCS | Performed by: INTERNAL MEDICINE

## 2020-10-01 PROCEDURE — 99213 OFFICE O/P EST LOW 20 MIN: CPT | Performed by: INTERNAL MEDICINE

## 2020-10-01 PROCEDURE — 3017F COLORECTAL CA SCREEN DOC REV: CPT | Performed by: INTERNAL MEDICINE

## 2020-10-01 PROCEDURE — G8427 DOCREV CUR MEDS BY ELIG CLIN: HCPCS | Performed by: INTERNAL MEDICINE

## 2020-10-01 PROCEDURE — 1101F PT FALLS ASSESS-DOCD LE1/YR: CPT | Performed by: INTERNAL MEDICINE

## 2020-10-01 NOTE — PROGRESS NOTES
Here for preop bilat cataracts 10/15/2020 and 10/29/2020 with Dr. Romelia Plascencia.  Please see scanned sheet.

## 2020-10-01 NOTE — PROGRESS NOTES
Chief Complaint   Patient presents with    Pre-op Exam     first OS then 10/29/2020 OD surgery          1. Have you been to the ER, urgent care clinic since your last visit? Hospitalized since your last visit? No    2. Have you seen or consulted any other health care providers outside of the [address] [place] [address] since your last visit? Include any pap smears or colon screening.  No

## 2020-11-27 ENCOUNTER — TELEPHONE (OUTPATIENT)
Dept: INTERNAL MEDICINE CLINIC | Age: 70
End: 2020-11-27

## 2020-11-27 NOTE — TELEPHONE ENCOUNTER
Attempted to call patient no answer. [facility] and Dr Amos Singleton would like to remind you that our records indicate you are due for a mammogram. If you have recently had a mammogram please notify the office so that we may update your medical record accordingly. Please feel free to reach out to the office with your questions.       Letter sent

## 2020-11-27 NOTE — LETTER
11/27/2020 4:42 PM 
 
Ms. Medina Cid [address] [place] [phone] [facility] and Dr Salina Charles would like to remind you that our records indicate you are due for a mammogram. If you have recently had a mammogram please notify the office so that we may update your medical record accordingly. Please feel free to reach out to the office with your questions. Kind Regards, Drew Rios [facility] [address] [address] Office:  [phone] Fax:  [phone] Sincerely, Edwardo Bartholomew MD

## 2021-03-31 ENCOUNTER — OFFICE VISIT (OUTPATIENT)
Dept: INTERNAL MEDICINE CLINIC | Age: 71
End: 2021-03-31
Payer: MEDICARE

## 2021-03-31 VITALS
WEIGHT: 159 LBS | RESPIRATION RATE: 18 BRPM | SYSTOLIC BLOOD PRESSURE: 121 MMHG | BODY MASS INDEX: 26.49 KG/M2 | TEMPERATURE: 98.1 F | HEIGHT: 65 IN | HEART RATE: 69 BPM | DIASTOLIC BLOOD PRESSURE: 67 MMHG

## 2021-03-31 DIAGNOSIS — E78.9 BORDERLINE HIGH CHOLESTEROL: ICD-10-CM

## 2021-03-31 DIAGNOSIS — Z71.89 ADVANCE DIRECTIVE DISCUSSED WITH PATIENT: ICD-10-CM

## 2021-03-31 DIAGNOSIS — G20 PARKINSON DISEASE (HCC): ICD-10-CM

## 2021-03-31 DIAGNOSIS — Z12.31 ENCOUNTER FOR SCREENING MAMMOGRAM FOR MALIGNANT NEOPLASM OF BREAST: ICD-10-CM

## 2021-03-31 DIAGNOSIS — Z00.00 MEDICARE ANNUAL WELLNESS VISIT, SUBSEQUENT: ICD-10-CM

## 2021-03-31 DIAGNOSIS — F41.9 ANXIETY: ICD-10-CM

## 2021-03-31 DIAGNOSIS — E03.9 ACQUIRED HYPOTHYROIDISM: ICD-10-CM

## 2021-03-31 DIAGNOSIS — I10 ESSENTIAL HYPERTENSION: Primary | ICD-10-CM

## 2021-03-31 PROCEDURE — 3017F COLORECTAL CA SCREEN DOC REV: CPT | Performed by: INTERNAL MEDICINE

## 2021-03-31 PROCEDURE — G8752 SYS BP LESS 140: HCPCS | Performed by: INTERNAL MEDICINE

## 2021-03-31 PROCEDURE — G8754 DIAS BP LESS 90: HCPCS | Performed by: INTERNAL MEDICINE

## 2021-03-31 PROCEDURE — G0439 PPPS, SUBSEQ VISIT: HCPCS | Performed by: INTERNAL MEDICINE

## 2021-03-31 PROCEDURE — G8419 CALC BMI OUT NRM PARAM NOF/U: HCPCS | Performed by: INTERNAL MEDICINE

## 2021-03-31 PROCEDURE — 99214 OFFICE O/P EST MOD 30 MIN: CPT | Performed by: INTERNAL MEDICINE

## 2021-03-31 PROCEDURE — 1090F PRES/ABSN URINE INCON ASSESS: CPT | Performed by: INTERNAL MEDICINE

## 2021-03-31 PROCEDURE — G8399 PT W/DXA RESULTS DOCUMENT: HCPCS | Performed by: INTERNAL MEDICINE

## 2021-03-31 PROCEDURE — G8510 SCR DEP NEG, NO PLAN REQD: HCPCS | Performed by: INTERNAL MEDICINE

## 2021-03-31 PROCEDURE — 1101F PT FALLS ASSESS-DOCD LE1/YR: CPT | Performed by: INTERNAL MEDICINE

## 2021-03-31 PROCEDURE — G9899 SCRN MAM PERF RSLTS DOC: HCPCS | Performed by: INTERNAL MEDICINE

## 2021-03-31 PROCEDURE — G8536 NO DOC ELDER MAL SCRN: HCPCS | Performed by: INTERNAL MEDICINE

## 2021-03-31 PROCEDURE — G8427 DOCREV CUR MEDS BY ELIG CLIN: HCPCS | Performed by: INTERNAL MEDICINE

## 2021-03-31 RX ORDER — ZOSTER VACCINE RECOMBINANT, ADJUVANTED 50 MCG/0.5
KIT INTRAMUSCULAR
Qty: 0.5 ML | Refills: 1 | Status: SHIPPED | OUTPATIENT
Start: 2021-03-31 | End: 2022-07-08 | Stop reason: ALTCHOICE

## 2021-03-31 NOTE — PROGRESS NOTES
Chief Complaint   Patient presents with    Blood Pressure Check    Abdominal Pain    Thyroid Problem    Sleep Problem    Medication Refill     30 day refills          1. Have you been to the ER, urgent care clinic since your last visit? Hospitalized since your last visit? No    2. Have you seen or consulted any other health care providers outside of the [address] [place] [address] since your last visit? Include any pap smears or colon screening.  No

## 2021-03-31 NOTE — PROGRESS NOTES
Subjective:     Carleen Bravo is a 69 y.o. female who presents for follow up of hypertension, hypothyroidism and parkinsons. Diet and Lifestyle: not attempting to follow a low sodium diet, sedentary  Home BP Monitoring: is not measured at home    Cardiovascular ROS: taking medications as instructed, no medication side effects noted, no TIA's, noting some chest pains described as left sided pressure while resting, stays nauseated, no associated sob, lasted 15 mintues. No dyspnea on exertion, no swelling of ankles. New concerns:   Hypothyroidism - feels euthyroid except tired. We increased her Sertraline from 25mg to 50mg daily. Continues with fatigue and nausea. At home very tired. When she sits down she falls asleep. Very anxious when trying to sleep. Cut back to 25mg daily when felt was not helping. Parkinsons - .trying to take her Sinemet 2 tabs tid but often misses a dose. Sometimes when eating feels food caught in chest. Has f/u with neurologist next month. Current Outpatient Medications   Medication Sig Dispense Refill    varicella-zoster recombinant, PF, (Shingrix, PF,) 50 mcg/0.5 mL susr injection 0.5mL by IntraMUSCular route once now and then repeat in 2-6 months 0.5 mL 1    diph,pertuss,acel,,tetanus vac,PF, (ADACEL) 2 Lf-(2.5-5-3-5 mcg)-5Lf/0.5 mL syrg vaccine 0.5 mL by IntraMUSCular route once for 1 dose. 0.5 mL 0    Synthroid 100 mcg tablet TAKE 1 TABLET BY MOUTH EVERY DAY BEFORE BREAKFAST. TAKE 1 AND 1/2 TABS ON SUNDAYS. 35 Tab 11    losartan-hydroCHLOROthiazide (HYZAAR) 50-12.5 mg per tablet TAKE 1 TAB BY MOUTH DAILY. NOTE NEW DOSE 90 Tab 3    carbidopa-levodopa (SINEMET)  mg per tablet TAKE 2 TAB BY MOUTH THREE TIMES A DAY FOLLOW DIRECTIONS GIVEN IN [facility]  3    cholecalciferol (VITAMIN D3) 1,000 unit cap Take 1,000 Units by mouth daily.       sertraline (ZOLOFT) 50 mg tablet TAKE 1 TABLET BY MOUTH EVERY DAY 90 Tab 3        Lab Results   Component Value Date/Time    Glucose 91 03/19/2020 12:04 PM    LDL, calculated 102.6 (H) 03/19/2020 12:04 PM    Creatinine 1.06 (H) 03/19/2020 12:04 PM      Lab Results   Component Value Date/Time    Cholesterol, total 188 03/19/2020 12:04 PM    HDL Cholesterol 62 03/19/2020 12:04 PM    LDL, calculated 102.6 (H) 03/19/2020 12:04 PM    LDL-C, External 112 04/04/2018    Triglyceride 117 03/19/2020 12:04 PM    CHOL/HDL Ratio 3.0 03/19/2020 12:04 PM     Lab Results   Component Value Date/Time    ALT (SGPT) 10 (L) 03/19/2020 12:04 PM    Alk. phosphatase 83 03/19/2020 12:04 PM    Bilirubin, total 0.7 03/19/2020 12:04 PM    Albumin 4.0 03/19/2020 12:04 PM    Protein, total 7.2 03/19/2020 12:04 PM    PLATELET 565 98/15/9731 10:00 AM     Lab Results   Component Value Date/Time    GFR est non-AA 51 (L) 03/19/2020 12:04 PM    GFR est AA >60 03/19/2020 12:04 PM    Creatinine 1.06 (H) 03/19/2020 12:04 PM    BUN 30 (H) 03/19/2020 12:04 PM    Sodium 138 03/19/2020 12:04 PM    Potassium 4.2 03/19/2020 12:04 PM    Chloride 105 03/19/2020 12:04 PM    CO2 31 03/19/2020 12:04 PM     Lab Results   Component Value Date/Time    TSH 1.85 03/19/2020 12:05 PM    T4, Free 1.4 03/19/2020 12:05 PM         Review of Systems, additional:  Pertinent items are noted in HPI. Objective:     Visit Vitals  /67 (BP 1 Location: Left upper arm, BP Patient Position: Sitting, BP Cuff Size: Adult)   Pulse 69   Temp 98.1 °F (36.7 °C) (Temporal)   Resp 18   Ht 5' 5\" (1.651 m)   Wt 159 lb (72.1 kg)   BMI 26.46 kg/m²     Appearance: alert, well appearing, and in no distress and oriented to person, place, and time. General exam:   . NECK: supple, no lad, no bruit, no tm  LUNGS: cta bilat  CV rrr, no m/g/r  ABD: soft, nt, nd, nabs  EXT: no c/c/e  Tremor noted right hand and leg > left extremities. Assessment/Plan:     hypertension well controlled. current treatment plan is effective, no change in therapy.    Check labs    Hypothyroidism - needs to adjust medications secondary to fatigue  Check labs    parkinsons - controlled, cont same    Anxiety and depression - much stemming from taking care of her  who is very demanding and has multiple medical issues. Will defer Zoloft to neurologist as he started the medication. ? Change to different SSRI    Borderline  cholesterol - repeat labs. Orders Placed This Encounter    Bilateral Digital Screening Mammography    METABOLIC PANEL, COMPREHENSIVE    LIPID PANEL    TSH 3RD GENERATION    T4, FREE    varicella-zoster recombinant, PF, (Shingrix, PF,) 50 mcg/0.5 mL susr injection    diph,pertuss,acel,,tetanus vac,PF, (ADACEL) 2 Lf-(2.5-5-3-5 mcg)-5Lf/0.5 mL syrg vaccine          This is the Subsequent Medicare Annual Wellness Exam, performed 12 months or more after the Initial AWV or the last Subsequent AWV    I have reviewed the patient's medical history in detail and updated the computerized patient record. Depression Risk Factor Screening:     3 most recent PHQ Screens 3/31/2021   Little interest or pleasure in doing things Not at all   Feeling down, depressed, irritable, or hopeless Not at all   Total Score PHQ 2 0       Alcohol Risk Screen    Do you average more than 1 drink per night or more than 7 drinks a week:  No    On any one occasion in the past three months have you have had more than 3 drinks containing alcohol:  No        Functional Ability and Level of Safety:    Hearing: mild decrease in hearing but does not want further eval at this time. Activities of Daily Living: The home contains: handrails  Patient does total self care      Ambulation: with mild difficulty  Will use cane for longer distances (grocery store)   Fall Risk:  Fall Risk Assessment, last 12 mths 3/31/2021   Able to walk? Yes   Fall in past 12 months? 0   Do you feel unsteady? 0   Are you worried about falling 0   Number of falls in past 12 months -   Fall with injury?  -      Abuse Screen:  Patient is not abused       Cognitive Screening    Has your family/caregiver stated any concerns about your memory: yes - forgetting things that were recently told her but can recall when stopped and concentrates     Cognitive Screening: Normal -      Assessment/Plan   Education and counseling provided:  Are appropriate based on today's review and evaluation   Advanced directive discussed with patient. Diagnoses and all orders for this visit:    1. Essential hypertension  -     METABOLIC PANEL, COMPREHENSIVE; Future  -     LIPID PANEL; Future    2. Acquired hypothyroidism  -     TSH 3RD GENERATION; Future  -     T4, FREE; Future    3. Parkinson disease ([facility] [address]    4. Advance directive discussed with patient    5. Anxiety    6. Medicare annual wellness visit, subsequent  -     varicella-zoster recombinant, PF, (Shingrix, PF,) 50 mcg/0.5 mL susr injection; 0.5mL by IntraMUSCular route once now and then repeat in 2-6 months  -     diph,pertuss,acel,,tetanus vac,PF, (ADACEL) 2 Lf-(2.5-5-3-5 mcg)-5Lf/0.5 mL syrg vaccine; 0.5 mL by IntraMUSCular route once for 1 dose. 7. Encounter for screening mammogram for malignant neoplasm of breast  -     ANDIE MAMMO BI SCREENING INCL CAD; Future    8. Borderline high cholesterol  -     METABOLIC PANEL, COMPREHENSIVE; Future  -     LIPID PANEL;  Future        Health Maintenance Due       Health Maintenance Due   Topic Date Due    COVID-19 Vaccine (1) Never done    DTaP/Tdap/Td series (1 - Tdap) Prescription given    Shingrix Vaccine Age 49> (1 of 2) Prescription given    Breast Cancer Screen Mammogram  Ordred today    Flu Vaccine (1) 09/01/2020    Medicare Yearly Exam  03/31/2022         Patient Care Team   Patient Care Team:  Nathalia Dao MD as PCP - General (Internal Medicine)  Nathalia Dao MD as PCP - [facility] Empaneled Provider  Tiny Reagan MD (Ophthalmology)  Delphia Spurling, MD (Neurology)  Iain Dobson MD (Ophthalmology)    History     Patient Active Problem List   Diagnosis Code    IBS (irritable bowel syndrome) K58.9    Fibrocystic breast N60.19    HTN (hypertension) I10    Hypothyroid E03.9    Obesity (BMI 30.0-34. 9) E66.9    OAB (overactive bladder) N32.81    Advance directive discussed with patient Z71.89    Tremor R25.1    POP (obstructive sleep apnea) G47.33    Parkinson disease (HCC) G20    Parkinson's disease ([address] [place]     Past Medical History:   Diagnosis Date    Fibrocystic breast disease     Hypertension     Hypothyroid 2/1998    s/p I131 treatment - [place]    IBS (irritable bowel syndrome)     POP on CPAP     4mm    Parkinson's disease ([address]     Pilonidal cyst 1968      Past Surgical History:   Procedure Laterality Date    HX APPENDECTOMY  1960    HX CYST REMOVAL      HX DILATION AND CURETTAGE  11/00    HX GYN  [id number],[id number]    exp laparoscopy for endometriosis    HX KNEE REPLACEMENT  9/10/10    right    HX ORTHOPAEDIC      r knee arthroscopy    HX ORTHOPAEDIC  12/01    left thumb and forearm surg    IN BREAST SURGERY PROCEDURE UNLISTED  10/09    benign lumpectomy - left     Current Outpatient Medications   Medication Sig Dispense Refill    varicella-zoster recombinant, PF, (Shingrix, PF,) 50 mcg/0.5 mL susr injection 0.5mL by IntraMUSCular route once now and then repeat in 2-6 months 0.5 mL 1    diph,pertuss,acel,,tetanus vac,PF, (ADACEL) 2 Lf-(2.5-5-3-5 mcg)-5Lf/0.5 mL syrg vaccine 0.5 mL by IntraMUSCular route once for 1 dose. 0.5 mL 0    Synthroid 100 mcg tablet TAKE 1 TABLET BY MOUTH EVERY DAY BEFORE BREAKFAST. TAKE 1 AND 1/2 TABS ON SUNDAYS. 35 Tab 11    losartan-hydroCHLOROthiazide (HYZAAR) 50-12.5 mg per tablet TAKE 1 TAB BY MOUTH DAILY. NOTE NEW DOSE 90 Tab 3    carbidopa-levodopa (SINEMET)  mg per tablet TAKE 2 TAB BY MOUTH THREE TIMES A DAY FOLLOW DIRECTIONS GIVEN IN [facility]  3    cholecalciferol (VITAMIN D3) 1,000 unit cap Take 1,000 Units by mouth daily.       sertraline (ZOLOFT) 50 mg tablet TAKE 1 TABLET BY MOUTH EVERY DAY 90 Tab 3 Allergies   Allergen Reactions    Latex Unknown (comments)    Augmentin [Amoxicillin-Pot Clavulanate] Diarrhea     She can tolerate Amoxicillin    Erythromycin Unknown (comments)    Motrin [Ibuprofen] Swelling     legs       Family History   Problem Relation Age of Onset    Hypertension Mother     Heart Attack Mother 80    COPD Father     Dementia Father         alzeihmers    Other Father         PUD     Social History     Tobacco Use    Smoking status: Never Smoker    Smokeless tobacco: Never Used   Substance Use Topics    Alcohol use: No     Alcohol/week: 0.0 standard drinks

## 2021-03-31 NOTE — PATIENT INSTRUCTIONS
Medicare Wellness Visit, Female The best way to live healthy is to have a lifestyle where you eat a well-balanced diet, exercise regularly, limit alcohol use, and quit all forms of tobacco/nicotine, if applicable. Regular preventive services are another way to keep healthy. Preventive services (vaccines, screening tests, monitoring & exams) can help personalize your care plan, which helps you manage your own care. Screening tests can find health problems at the earliest stages, when they are easiest to treat. [facility] follows the current, evidence-based guidelines published by the [facility] Francois ([facility]) when recommending preventive services for our patients. Because we follow these guidelines, sometimes recommendations change over time as research supports it. (For example, mammograms used to be recommended annually. Even though Medicare will still pay for an annual mammogram, the newer guidelines recommend a mammogram every two years for women of average risk). Of course, you and your doctor may decide to screen more often for some diseases, based on your risk and your co-morbidities (chronic disease you are already diagnosed with). Preventive services for you include: - Medicare offers their members a free annual wellness visit, which is time for you and your primary care provider to discuss and plan for your preventive service needs. Take advantage of this benefit every year! 
-All adults over the age of 72 should receive the recommended pneumonia vaccines. Current USPSTF guidelines recommend a series of two vaccines for the best pneumonia protection.  
-All adults should have a flu vaccine yearly and a tetanus vaccine every 10 years.  
-All adults age 48 and older should receive the shingles vaccines (series of two vaccines).      
-All adults age 38-68 who are overweight should have a diabetes screening test once every three years.  
-All adults born between [birth year] and 1965 should be screened once for Hepatitis C. 
-Other screening tests and preventive services for persons with diabetes include: an eye exam to screen for diabetic retinopathy, a kidney function test, a foot exam, and stricter control over your cholesterol.  
-Cardiovascular screening for adults with routine risk involves an electrocardiogram (ECG) at intervals determined by your doctor.  
-Colorectal cancer screenings should be done for adults age 54-65 with no increased risk factors for colorectal cancer. There are a number of acceptable methods of screening for this type of cancer. Each test has its own benefits and drawbacks. Discuss with your doctor what is most appropriate for you during your annual wellness visit. The different tests include: colonoscopy (considered the best screening method), a fecal occult blood test, a fecal DNA test, and sigmoidoscopy. 
 
-A bone mass density test is recommended when a woman turns 65 to screen for osteoporosis. This test is only recommended one time, as a screening. Some providers will use this same test as a disease monitoring tool if you already have osteoporosis. -Breast cancer screenings are recommended every other year for women of normal risk, age 54-69. 
-Cervical cancer screenings for women over age 72 are only recommended with certain risk factors. Here is a list of your current Health Maintenance items (your personalized list of preventive services) with a due date: 
 
Health Maintenance Due Topic Date Due  
 COVID-19 Vaccine (1) Never done  DTaP/Tdap/Td series (1 - Tdap) Prescription given  Shingrix Vaccine Age 50> (1 of 2) Prescription given  Breast Cancer Screen Mammogram  Ordred today  Flu Vaccine (1) 09/01/2020  Medicare Yearly Exam  03/31/2022

## 2021-04-07 LAB
ALBUMIN SERPL-MCNC: 4.5 G/DL (ref 3.7–4.7)
ALBUMIN/GLOB SERPL: 1.7 {RATIO} (ref 1.2–2.2)
ALP SERPL-CCNC: 95 IU/L (ref 39–117)
ALT SERPL-CCNC: 6 IU/L (ref 0–32)
AST SERPL-CCNC: 15 IU/L (ref 0–40)
BILIRUB SERPL-MCNC: 0.8 MG/DL (ref 0–1.2)
BUN SERPL-MCNC: 21 MG/DL (ref 8–27)
BUN/CREAT SERPL: 20 (ref 12–28)
CALCIUM SERPL-MCNC: 10.2 MG/DL (ref 8.7–10.3)
CHLORIDE SERPL-SCNC: 97 MMOL/L (ref 96–106)
CHOLEST SERPL-MCNC: 176 MG/DL (ref 100–199)
CO2 SERPL-SCNC: 24 MMOL/L (ref 20–29)
CREAT SERPL-MCNC: 1.04 MG/DL (ref 0.57–1)
GLOBULIN SER CALC-MCNC: 2.6 G/DL (ref 1.5–4.5)
GLUCOSE SERPL-MCNC: 96 MG/DL (ref 65–99)
HDLC SERPL-MCNC: 50 MG/DL
IMP & REVIEW OF LAB RESULTS: NORMAL
INTERPRETATION: NORMAL
LDLC SERPL CALC-MCNC: 101 MG/DL (ref 0–99)
PDF IMAGE, 910387: NORMAL
POTASSIUM SERPL-SCNC: 4.1 MMOL/L (ref 3.5–5.2)
PROT SERPL-MCNC: 7.1 G/DL (ref 6–8.5)
SODIUM SERPL-SCNC: 138 MMOL/L (ref 134–144)
T4 FREE SERPL-MCNC: 1.95 NG/DL (ref 0.82–1.77)
TRIGL SERPL-MCNC: 143 MG/DL (ref 0–149)
TSH SERPL DL<=0.005 MIU/L-ACNC: 0.34 UIU/ML (ref 0.45–4.5)
VLDLC SERPL CALC-MCNC: 25 MG/DL (ref 5–40)

## 2021-04-08 ENCOUNTER — TELEPHONE (OUTPATIENT)
Dept: INTERNAL MEDICINE CLINIC | Age: 71
End: 2021-04-08

## 2021-04-08 DIAGNOSIS — E03.9 ACQUIRED HYPOTHYROIDISM: Primary | ICD-10-CM

## 2021-04-08 RX ORDER — LEVOTHYROXINE SODIUM 100 UG/1
TABLET ORAL
Qty: 30 TAB | Refills: 0
Start: 2021-04-08 | End: 2021-08-19

## 2021-04-08 NOTE — TELEPHONE ENCOUNTER
Discussed suppressed TSH. Currently on Synthroid 100mcg every day. Will decrease to 1 daily except 1/2 on Sunday for a total of 6.5 tabs per week. Repeat labs in 6 weeks.

## 2021-04-28 ENCOUNTER — TELEPHONE (OUTPATIENT)
Dept: INTERNAL MEDICINE CLINIC | Age: 71
End: 2021-04-28

## 2021-04-28 NOTE — TELEPHONE ENCOUNTER
Patient has been advised that depending on her symptoms they may not let her in to do the vaccine.  If she decides to not go, she will need call and reschedule her vaccine

## 2021-04-28 NOTE — TELEPHONE ENCOUNTER
----- Message from [facility] sent at 4/28/2021 10:32 AM EDT -----  Regarding: Dr. Sedrick Brannon  General Message/Vendor Calls    Caller's first and last name: pt      Reason for call: Pt is getting a covid shot at 2pm today and is feeling bad, has nausea and is weaning off of some medication, is it safe to get the vaccine still?       Callback required yes/no and why: Yes      Best contact number(s): [phone]      Details to clarify the request: [facility]

## 2021-05-16 RX ORDER — LOSARTAN POTASSIUM AND HYDROCHLOROTHIAZIDE 12.5; 5 MG/1; MG/1
1 TABLET ORAL DAILY
Qty: 90 TAB | Refills: 3 | Status: SHIPPED | OUTPATIENT
Start: 2021-05-16 | End: 2022-05-19

## 2021-06-08 ENCOUNTER — OFFICE VISIT (OUTPATIENT)
Dept: INTERNAL MEDICINE CLINIC | Age: 71
End: 2021-06-08
Payer: MEDICARE

## 2021-06-08 VITALS
BODY MASS INDEX: 25.49 KG/M2 | OXYGEN SATURATION: 95 % | TEMPERATURE: 98.3 F | RESPIRATION RATE: 20 BRPM | HEIGHT: 65 IN | DIASTOLIC BLOOD PRESSURE: 78 MMHG | WEIGHT: 153 LBS | SYSTOLIC BLOOD PRESSURE: 118 MMHG | HEART RATE: 88 BPM

## 2021-06-08 DIAGNOSIS — R35.0 URINARY FREQUENCY: Primary | ICD-10-CM

## 2021-06-08 PROCEDURE — 81002 URINALYSIS NONAUTO W/O SCOPE: CPT | Performed by: PHYSICIAN ASSISTANT

## 2021-06-08 PROCEDURE — G8754 DIAS BP LESS 90: HCPCS | Performed by: PHYSICIAN ASSISTANT

## 2021-06-08 PROCEDURE — G8427 DOCREV CUR MEDS BY ELIG CLIN: HCPCS | Performed by: PHYSICIAN ASSISTANT

## 2021-06-08 PROCEDURE — 1101F PT FALLS ASSESS-DOCD LE1/YR: CPT | Performed by: PHYSICIAN ASSISTANT

## 2021-06-08 PROCEDURE — 99213 OFFICE O/P EST LOW 20 MIN: CPT | Performed by: PHYSICIAN ASSISTANT

## 2021-06-08 PROCEDURE — G9899 SCRN MAM PERF RSLTS DOC: HCPCS | Performed by: PHYSICIAN ASSISTANT

## 2021-06-08 PROCEDURE — G8536 NO DOC ELDER MAL SCRN: HCPCS | Performed by: PHYSICIAN ASSISTANT

## 2021-06-08 PROCEDURE — G8432 DEP SCR NOT DOC, RNG: HCPCS | Performed by: PHYSICIAN ASSISTANT

## 2021-06-08 PROCEDURE — 3017F COLORECTAL CA SCREEN DOC REV: CPT | Performed by: PHYSICIAN ASSISTANT

## 2021-06-08 PROCEDURE — G8419 CALC BMI OUT NRM PARAM NOF/U: HCPCS | Performed by: PHYSICIAN ASSISTANT

## 2021-06-08 PROCEDURE — G8752 SYS BP LESS 140: HCPCS | Performed by: PHYSICIAN ASSISTANT

## 2021-06-08 PROCEDURE — G8399 PT W/DXA RESULTS DOCUMENT: HCPCS | Performed by: PHYSICIAN ASSISTANT

## 2021-06-08 PROCEDURE — 1090F PRES/ABSN URINE INCON ASSESS: CPT | Performed by: PHYSICIAN ASSISTANT

## 2021-06-08 NOTE — PROGRESS NOTES
Chief Complaint   Patient presents with    Urinary Frequency     she is a 66y.o. year old female who presents for evaluation. Patient presents the office today for evaluation of possible urinary tract infection. She reports that she has been having some urgency and frequency. The patient reports that her urine has not had a foul smell or appeared abnormal.  Patient reports she has not had any lower abdominal pain. She has had some mild back pain but is not sure if that is just related to her arthritis. Reviewed PmHx, RxHx, FmHx, SocHx, AllgHx and updated and dated in the chart. Review of Systems - negative except as listed above    Objective:     Vitals:    06/08/21 1126   BP: 118/78   Pulse: 88   Resp: 20   Temp: 98.3 °F (36.8 °C)   TempSrc: Temporal   SpO2: 95%   Weight: 153 lb (69.4 kg)   Height: 5' 5\" (1.651 m)     Physical Examination: General appearance - alert, well appearing, and in no distress  Mental status - normal mood, behavior, speech, dress, motor activity, and thought processes  Abdomen - soft, nontender, nondistended, no masses or organomegaly    Assessment/ Plan:   Diagnoses and all orders for this visit:    1. Urinary frequency  -     CULTURE, URINE; Future  -     AMB POC URINALYSIS DIP STICK MANUAL W/O MICRO  -     nitrofurantoin, macrocrystal-monohydrate, (MACROBID) 100 mg capsule; Take 1 Capsule by mouth two (2) times a day. Patient was unable to give a urine sample here in the office. She has been given the materials to take home and will bring us back a urine sample. Patient be treated after this based on urine analysis. Urine will then be sent for urine culture. Addendum:  Patient was able to return with urine sample. Will be sending in antibiotic for her to start. Will contact patient once culture is back. I have discussed the diagnosis with the patient and the intended plan as seen in the above orders.   The patient has received an after-visit summary and questions were answered concerning future plans.      Medication Side Effects and Warnings were discussed with patient: n/a  Patient Labs were reviewed and or requested: n/a  Patient Past Records were reviewed and or requested  yes    Dolores Jones PA-C

## 2021-06-09 LAB
BILIRUB UR QL STRIP: NEGATIVE
GLUCOSE UR-MCNC: NEGATIVE MG/DL
KETONES P FAST UR STRIP-MCNC: NEGATIVE MG/DL
PH UR STRIP: 5.5 [PH] (ref 4.6–8)
PROT UR QL STRIP: NEGATIVE
SP GR UR STRIP: 1.02 (ref 1–1.03)
UA UROBILINOGEN AMB POC: NORMAL (ref 0.2–1)
URINALYSIS CLARITY POC: NORMAL
URINALYSIS COLOR POC: NORMAL
URINE BLOOD POC: NORMAL
URINE LEUKOCYTES POC: NORMAL
URINE NITRITES POC: POSITIVE

## 2021-06-09 RX ORDER — NITROFURANTOIN 25; 75 MG/1; MG/1
100 CAPSULE ORAL 2 TIMES DAILY
Qty: 10 CAPSULE | Refills: 0 | Status: SHIPPED | OUTPATIENT
Start: 2021-06-09 | End: 2021-07-30 | Stop reason: ALTCHOICE

## 2021-06-12 LAB
BACTERIA SPEC CULT: ABNORMAL
CC UR VC: ABNORMAL
SERVICE CMNT-IMP: ABNORMAL

## 2021-06-14 NOTE — PROGRESS NOTES
Please let the patient know her urine came back positive for e.coli. Let the patient know the medication prescribed should cover the infection.  Please follow up if symptoms persist. Thank you

## 2021-07-29 RX ORDER — SERTRALINE HYDROCHLORIDE 50 MG/1
TABLET, FILM COATED ORAL
Qty: 90 TABLET | Refills: 3 | Status: SHIPPED | OUTPATIENT
Start: 2021-07-29 | End: 2022-10-16

## 2021-07-30 ENCOUNTER — TELEPHONE (OUTPATIENT)
Dept: INTERNAL MEDICINE CLINIC | Age: 71
End: 2021-07-30

## 2021-07-30 ENCOUNTER — HOSPITAL ENCOUNTER (OUTPATIENT)
Dept: GENERAL RADIOLOGY | Age: 71
Discharge: HOME | End: 2021-07-30
Payer: MEDICARE

## 2021-07-30 ENCOUNTER — OFFICE VISIT (OUTPATIENT)
Dept: INTERNAL MEDICINE CLINIC | Age: 71
End: 2021-07-30

## 2021-07-30 VITALS
TEMPERATURE: 97.8 F | WEIGHT: 156 LBS | BODY MASS INDEX: 25.99 KG/M2 | RESPIRATION RATE: 20 BRPM | HEIGHT: 65 IN | OXYGEN SATURATION: 96 % | HEART RATE: 67 BPM | SYSTOLIC BLOOD PRESSURE: 128 MMHG | DIASTOLIC BLOOD PRESSURE: 68 MMHG

## 2021-07-30 DIAGNOSIS — S80.11XA CONTUSION OF RIGHT LEG, INITIAL ENCOUNTER: ICD-10-CM

## 2021-07-30 DIAGNOSIS — S80.11XA CONTUSION OF RIGHT LEG, INITIAL ENCOUNTER: Primary | ICD-10-CM

## 2021-07-30 PROCEDURE — 1090F PRES/ABSN URINE INCON ASSESS: CPT | Performed by: PHYSICIAN ASSISTANT

## 2021-07-30 PROCEDURE — G8754 DIAS BP LESS 90: HCPCS | Performed by: PHYSICIAN ASSISTANT

## 2021-07-30 PROCEDURE — G8399 PT W/DXA RESULTS DOCUMENT: HCPCS | Performed by: PHYSICIAN ASSISTANT

## 2021-07-30 PROCEDURE — G8752 SYS BP LESS 140: HCPCS | Performed by: PHYSICIAN ASSISTANT

## 2021-07-30 PROCEDURE — G8419 CALC BMI OUT NRM PARAM NOF/U: HCPCS | Performed by: PHYSICIAN ASSISTANT

## 2021-07-30 PROCEDURE — G8536 NO DOC ELDER MAL SCRN: HCPCS | Performed by: PHYSICIAN ASSISTANT

## 2021-07-30 PROCEDURE — G9899 SCRN MAM PERF RSLTS DOC: HCPCS | Performed by: PHYSICIAN ASSISTANT

## 2021-07-30 PROCEDURE — G8427 DOCREV CUR MEDS BY ELIG CLIN: HCPCS | Performed by: PHYSICIAN ASSISTANT

## 2021-07-30 PROCEDURE — 99213 OFFICE O/P EST LOW 20 MIN: CPT | Performed by: PHYSICIAN ASSISTANT

## 2021-07-30 PROCEDURE — 1101F PT FALLS ASSESS-DOCD LE1/YR: CPT | Performed by: PHYSICIAN ASSISTANT

## 2021-07-30 PROCEDURE — 3017F COLORECTAL CA SCREEN DOC REV: CPT | Performed by: PHYSICIAN ASSISTANT

## 2021-07-30 PROCEDURE — 73590 X-RAY EXAM OF LOWER LEG: CPT | Performed by: PHYSICIAN ASSISTANT

## 2021-07-30 PROCEDURE — G8432 DEP SCR NOT DOC, RNG: HCPCS | Performed by: PHYSICIAN ASSISTANT

## 2021-07-30 RX ORDER — ROPINIROLE 1 MG/1
TABLET, FILM COATED ORAL
COMMUNITY
Start: 2021-07-08

## 2021-07-30 NOTE — PROGRESS NOTES
Chief Complaint   Patient presents with    Fall    Skin Problem     she is a 66y.o. year old female who presents for evaluation. The patient presents to the office today for evaluation after sustaining a fall. She reports that she has had 2 falls recently. She states that she has been taking physical therapy to help with her strength. The physical therapist noticed an area on her leg and advised her to get it evaluated. The patient states that the area is very sore and it still swollen. She also reports since the fall she has been having intermittent pain of both of her hips. She states the pain is not constant and she is able to ambulate. The patient shares that she takes care of her mother as well as her . She reports that this is starting to become difficult. She states that she finds some days that she can fall asleep just sitting in a chair. The patient states she knows this is happening because she is exhausted. Reviewed PmHx, RxHx, FmHx, SocHx, AllgHx and updated and dated in the chart. Review of Systems - negative except as listed above    Objective:     Vitals:    07/30/21 0941   BP: 128/68   Pulse: 67   Resp: 20   Temp: 97.8 °F (36.6 °C)   TempSrc: Temporal   SpO2: 96%   Weight: 156 lb (70.8 kg)   Height: 5' 5\" (1.651 m)     Physical Examination: General appearance - alert, well appearing, and in no distress  Mental status - normal mood, behavior, speech, dress, motor activity, and thought processes  Musculoskeletal -right lower extremityalong the medial aspect of the right lower extremity patient has a raised tender hematoma. The area is approximately 5 cm in size    Assessment/ Plan:   Diagnoses and all orders for this visit:    1. Contusion of right leg, initial encounter  -     XR TIB/FIB RT; Future    Advised the patient that I think she should have an x-ray done of her lower extremity. She and I talked about getting x-rays of her hips but have decided to hold for now. The patient is scheduled to follow-up with Dr. Melany Barrios at her regular scheduled time in September. She understands that she may contact the office sooner if the hip pain does not improve. Also I would like to speak to Dr. Melany Barrios to see if there is any options or help that maybe she could get to help her to take care of her mother and her . I explained to the patient that I am sure that is very difficult to care for 2 people. I will contact the patient if I learn of any additional resources. I have discussed the diagnosis with the patient and the intended plan as seen in the above orders. The patient has received an after-visit summary and questions were answered concerning future plans.      Medication Side Effects and Warnings were discussed with patient: n/a  Patient Labs were reviewed and or requested: yes  Patient Past Records were reviewed and or requested  yes    Dolores Jones PA-C

## 2021-07-30 NOTE — PROGRESS NOTES
Writer contacted patient per Claude Ireland to inform of no fracture seen on x-ray and the radiologist made note of mild soft tissue swelling, this would be consistent with a contusion. Patient was advise we will be back in contact with her once we speak to Dr. Alia Benedict about possible resources to help with taking care of her mother and , patient verbalized understanding.

## 2021-07-30 NOTE — PROGRESS NOTES
Please let the patient know. No fracture seen on x-ray. The radiologist makes note of mild soft tissue swelling. This would be consistent with a contusion. Thank you. Will be back in contact with her once I speak to Dr. Josue Verdin about possible resources to help with taking care of her mother and .  thanks

## 2021-07-30 NOTE — PROGRESS NOTES
1. Have you been to the ER, urgent care clinic since your last visit? Hospitalized since your last visit? No    2. Have you seen or consulted any other health care providers outside of the [address] [place] [address] since your last visit? Include any pap smears or colon screening. No    Health Maintenance Due   Topic Date Due    COVID-19 Vaccine (1) Never done    DTaP/Tdap/Td series (1 - Tdap) Never done    Shingrix Vaccine Age 50> (1 of 2) Never done    Breast Cancer Screen Mammogram  09/26/2019     Patient has had 2 falls, patient is doing PT. Patient is taking care of her  and mother. Patient has a skin abrasion on the right leg, red.

## 2021-08-03 ENCOUNTER — TELEPHONE (OUTPATIENT)
Dept: INTERNAL MEDICINE CLINIC | Age: 71
End: 2021-08-03

## 2021-08-03 NOTE — TELEPHONE ENCOUNTER
Please let the patient know that I spoke with Dr. Len Peralta about her situation. I currently have a request to place with our care management team to see if they can provide some type of resources.   Thank you

## 2021-08-05 NOTE — TELEPHONE ENCOUNTER
Writer called to speak with patient and  got on the line and informed writer that they already have since 2018 [address] coming to the home and they would prefer to keep the same service they are happy with. Writer informed  that is fine and to reach out to them for help for the patient,  agreed, will contact office if further assistance is needed from our office.

## 2021-08-19 RX ORDER — LEVOTHYROXINE SODIUM 100 UG/1
TABLET ORAL
Qty: 96 TABLET | Refills: 4 | Status: SHIPPED | OUTPATIENT
Start: 2021-08-19

## 2021-12-27 ENCOUNTER — NURSE TRIAGE (OUTPATIENT)
Dept: OTHER | Facility: CLINIC | Age: 71
End: 2021-12-27

## 2022-03-18 PROBLEM — R25.1 TREMOR: Status: ACTIVE | Noted: 2018-03-19

## 2022-03-19 PROBLEM — G47.33 OSA (OBSTRUCTIVE SLEEP APNEA): Status: ACTIVE | Noted: 2018-07-31

## 2022-03-19 PROBLEM — G20.A1 PARKINSON DISEASE: Status: ACTIVE | Noted: 2018-07-31

## 2022-03-19 PROBLEM — G20 PARKINSON DISEASE (HCC): Status: ACTIVE | Noted: 2018-07-31

## 2022-03-19 PROBLEM — Z71.89 ADVANCE DIRECTIVE DISCUSSED WITH PATIENT: Status: ACTIVE | Noted: 2017-07-18

## 2022-07-07 PROBLEM — F33.0 MILD EPISODE OF RECURRENT MAJOR DEPRESSIVE DISORDER (HCC): Status: ACTIVE | Noted: 2022-07-07

## 2022-07-07 NOTE — PROGRESS NOTES
Subjective:     Elvira Capps is a 67 y.o. female who presents for follow up of hypertension, hypothyroidism and parkinsons. .    Diet and Lifestyle: generally follows a low sodium diet  Home BP Monitoring: is not measured at home    Cardiovascular ROS: taking medications as instructed, no medication side effects noted, no TIA's, no chest pain on exertion, no dyspnea on exertion, noting swelling of ankles, no orthostatic dizziness or lightheadedness, no palpitations. New concerns:   Taking care of her mother who broke her hip and  with HF. Has lost 20+ lbs in the last year. Had been as low as 120s but has gained some weight back. Trying to eat more recently. Parkinsons - last saw Dr. Jesse Thompson, Neurologist, 6 months ago. Hard to take her medications regularly. When misses midday Sinemet finds vision is altered. Intermittent issues swallowing. Feels like she is full to her upper esophagus. Belches as feels better. No heartburn. No vomiting but baseline nausea. + constipation. Taking only 1/2 of Sertraline. LT4 - taking 1 full pill daily Mon-Sat but 1/2 pill on Sunday    Current Outpatient Medications   Medication Sig Dispense Refill    varicella-zoster recombinant, PF, (Shingrix, PF,) 50 mcg/0.5 mL susr injection 0.5mL by IntraMUSCular route once now and then repeat in 2-6 months 0.5 mL 1    diph,pertuss,acel,,tetanus vac,PF, (ADACEL) 2 Lf-(2.5-5-3-5 mcg)-5Lf/0.5 mL syrg vaccine 0.5 mL by IntraMUSCular route once for 1 dose. 0.5 mL 0    losartan-hydroCHLOROthiazide (HYZAAR) 50-12.5 mg per tablet Take 1 Tablet by mouth daily.  30 Tablet 0    sertraline (ZOLOFT) 50 mg tablet TAKE 1 TABLET BY MOUTH EVERY DAY (Patient taking differently: TAKE 1/2 TABLET BY MOUTH EVERY DAY) 90 Tablet 3    carbidopa-levodopa (SINEMET)  mg per tablet TAKE 2 TAB BY MOUTH THREE TIMES A DAY FOLLOW DIRECTIONS GIVEN IN [facility]  3    cholecalciferol (VITAMIN D3) 1,000 unit cap Take 1,000 Units by mouth daily.      Synthroid 100 mcg tablet TAKE 1 TABLET BY MOUTH EVERY DAY BEFORE BREAKFAST AND TAKE 1 AND 1/2 TABS ON SUNDAYS. (Patient taking differently: TAKE 1 TABLET BY MOUTH EVERY DAY BEFORE BREAKFAST AND TAKE 1/2 TABS ON SUNDAYS.) 96 Tablet 4    rOPINIRole (REQUIP) 1 mg tablet TAKE 1 TABLET BY MOUTH EVERYDAY AT BEDTIME (Patient not taking: Reported on 7/8/2022)          Lab Results   Component Value Date/Time    Glucose 96 04/06/2021 12:10 PM    LDL, calculated 101 (H) 04/06/2021 12:10 PM    LDL, calculated 102.6 (H) 03/19/2020 12:04 PM    Creatinine 1.04 (H) 04/06/2021 12:10 PM      Lab Results   Component Value Date/Time    Cholesterol, total 176 04/06/2021 12:10 PM    HDL Cholesterol 50 04/06/2021 12:10 PM    LDL, calculated 101 (H) 04/06/2021 12:10 PM    LDL, calculated 102.6 (H) 03/19/2020 12:04 PM    LDL-C, External 112 04/04/2018 12:00 AM    Triglyceride 143 04/06/2021 12:10 PM    CHOL/HDL Ratio 3.0 03/19/2020 12:04 PM     Lab Results   Component Value Date/Time    ALT (SGPT) 6 04/06/2021 12:10 PM    Alk. phosphatase 95 04/06/2021 12:10 PM    Bilirubin, total 0.8 04/06/2021 12:10 PM    Albumin 4.5 04/06/2021 12:10 PM    Protein, total 7.1 04/06/2021 12:10 PM    PLATELET 626 60/95/8166 10:00 AM     Lab Results   Component Value Date/Time    GFR est non-AA 54 (L) 04/06/2021 12:10 PM    GFR est AA 62 04/06/2021 12:10 PM    Creatinine 1.04 (H) 04/06/2021 12:10 PM    BUN 21 04/06/2021 12:10 PM    Sodium 138 04/06/2021 12:10 PM    Potassium 4.1 04/06/2021 12:10 PM    Chloride 97 04/06/2021 12:10 PM    CO2 24 04/06/2021 12:10 PM     Lab Results   Component Value Date/Time    TSH 0.343 (L) 04/06/2021 12:10 PM    T4, Free 1.95 (H) 04/06/2021 12:10 PM         Review of Systems, additional:  Pertinent items are noted in HPI.     Objective:     Visit Vitals  BP (!) 148/79 (BP 1 Location: Left upper arm, BP Patient Position: Sitting, BP Cuff Size: Adult)   Pulse 64   Temp 98.3 °F (36.8 °C) (Temporal)   Resp 16   Ht 5' 5\" (1.651 m)   Wt 134 lb 3.2 oz (60.9 kg)   SpO2 98%   BMI 22.33 kg/m²     Appearance: alert, well appearing, and in no distress and oriented to person, place, and time. General exam:   NECK: supple, no lad, no bruit, no tm  LUNGS: cta bilat  CV rrr, no m/g/r  ABD: soft, nt, nd, nabs  EXT: no c/c/e  Spine -kyphosis of thoracic spine  Neuro - flat affect, depressed speech. Mildly unstable gait. Does not have her cane today     Assessment/Plan:     Diagnoses and all orders for this visit:    1. Essential hypertension - controlled, cont same meds  -     METABOLIC PANEL, COMPREHENSIVE; Future  -     LIPID PANEL; Future    2. Mild episode of recurrent major depressive disorder (HCC) - not at goal.  Increase Sertraline to 50mg daily. 3. Parkinson disease ([facility] [address] - missing doses during the day. Discussed strategies for remembering to take meds. Encouraged f/u with neuro    4. Acquired hypothyroidism - ? If need to increase meds. Repeat labs  -     TSH 3RD GENERATION; Future  -     T4, FREE; Future    5. Borderline high cholesterol - not on meds. Repeat labs. -     METABOLIC PANEL, COMPREHENSIVE; Future  -     LIPID PANEL; Future    6. Medicare annual wellness visit, subsequent  -     varicella-zoster recombinant, PF, (Shingrix, PF,) 50 mcg/0.5 mL susr injection; 0.5mL by IntraMUSCular route once now and then repeat in 2-6 months  -     diph,pertuss,acel,,tetanus vac,PF, (ADACEL) 2 Lf-(2.5-5-3-5 mcg)-5Lf/0.5 mL syrg vaccine; 0.5 mL by IntraMUSCular route once for 1 dose. 7. Screen for colon cancer  -     COLOGUARD TEST (FECAL DNA COLORECTAL CANCER SCREENING)    8. Encounter for screening mammogram for malignant neoplasm of breast  -     ANDIE MAMMO BI SCREENING INCL CAD; Future    9. Postmenopausal - mother with osteoporosis and hip fx  -     DEXA BONE DENSITY STUDY AXIAL; Future    10. Vitamin D deficiency  -     VITAMIN D, 25 HYDROXY; Future    11. Advance directive discussed with patient    12.  Constipation, unspecified constipation type - start docusate daily. ? If contributing to fullness issue. May need omeprazole. Close f/u 2 months. This is the Subsequent Medicare Annual Wellness Exam, performed 12 months or more after the Initial AWV or the last Subsequent AWV    I have reviewed the patient's medical history in detail and updated the computerized patient record. Assessment/Plan   Education and counseling provided:  Are appropriate based on today's review and evaluation  advanced directive discussed with patient. 1. Essential hypertension  -     METABOLIC PANEL, COMPREHENSIVE; Future  -     LIPID PANEL; Future  2. Mild episode of recurrent major depressive disorder ([facility] [address]  3. Parkinson disease ([facility] [address]  4. Acquired hypothyroidism  -     TSH 3RD GENERATION; Future  -     T4, FREE; Future  5. Borderline high cholesterol  -     METABOLIC PANEL, COMPREHENSIVE; Future  -     LIPID PANEL; Future  6. Medicare annual wellness visit, subsequent  -     varicella-zoster recombinant, PF, (Shingrix, PF,) 50 mcg/0.5 mL susr injection; 0.5mL by IntraMUSCular route once now and then repeat in 2-6 months, Print, Disp-0.5 mL, R-1  -     diph,pertuss,acel,,tetanus vac,PF, (ADACEL) 2 Lf-(2.5-5-3-5 mcg)-5Lf/0.5 mL syrg vaccine; 0.5 mL by IntraMUSCular route once for 1 dose., Print, Disp-0.5 mL, R-0  7. Screen for colon cancer  -     COLOGUARD TEST (FECAL DNA COLORECTAL CANCER SCREENING)  8. Encounter for screening mammogram for malignant neoplasm of breast  -     ANDIE MAMMO BI SCREENING INCL CAD; Future  9. Postmenopausal  -     DEXA BONE DENSITY STUDY AXIAL; Future  10. Vitamin D deficiency  -     VITAMIN D, 25 HYDROXY; Future  11. Advance directive discussed with patient  12.  Constipation, unspecified constipation type       Depression Risk Factor Screening     3 most recent PHQ Screens 7/8/2022   Little interest or pleasure in doing things Not at all   Feeling down, depressed, irritable, or hopeless Not at all Total Score PHQ 2 0       Alcohol & Drug Abuse Risk Screen    Do you average more than 1 drink per night or more than 7 drinks a week:  No    On any one occasion in the past three months have you have had more than 3 drinks containing alcohol:  No          Functional Ability and Level of Safety    Hearing: hearing is mildly decreased. does not want to have hearing testing at this time. Activities of Daily Living: The home contains: handrails and grab bars  Patient does total self care      Ambulation: with mild difficulty [ using cane outside of house     Fall Risk:  Fall Risk Assessment, last 12 mths 7/8/2022   Able to walk? Yes   Fall in past 12 months? 1   Do you feel unsteady? 1   Are you worried about falling 1   Number of falls in past 12 months 1   Fall with injury? 1   fell getting out of bathtub. Mild back pain post fall. Abuse Screen:  Patient is not abused       Cognitive Screening    Has your family/caregiver stated any concerns about your memory: yes - will forget things told to her but then remembers later.        Cognitive Screening: Normal -      Health Maintenance Due     Health Maintenance Due   Topic Date Due    COVID-19 Vaccine (1) Up to date primary series    DTaP/Tdap/Td series (1 - Tdap) Prescription given    Shingrix Vaccine Age 49> (1 of 2) Prescription given    Breast Cancer Screen Mammogram  Ordered today    Medicare Yearly Exam  07/08/2023    Depression Monitoring  Completed today    Colorectal Cancer Screening Combo  Cologuard ordered today       Patient Care Team   Patient Care Team:  Aaliyah Leslie MD as PCP - General (Internal Medicine Physician)  Aaliyah Leslie MD as PCP - [facility] Empaneled Provider  Shaniqua Bernardo MD (Ophthalmology)  Gisela Shaikh MD (Neurology)  Corwin Chang MD (Ophthalmology)    History     Patient Active Problem List   Diagnosis Code    IBS (irritable bowel syndrome) K58.9    Fibrocystic breast N60.19    HTN (hypertension) I5    Hypothyroid E03.9    Obesity (BMI 30.0-34. 9) E66.9    OAB (overactive bladder) N32.81    Advance directive discussed with patient Z71.89    Tremor R25.1    OPP (obstructive sleep apnea) G47.33    Parkinson disease ([address] G20    Parkinson's disease ([address] G20    Mild episode of recurrent major depressive disorder ([address] F33.0     Past Medical History:   Diagnosis Date    Fibrocystic breast disease     Hypertension     Hypothyroid 2/1998    s/p I131 treatment - [place]    IBS (irritable bowel syndrome)     POP on CPAP     4mm    Parkinson's disease ([address]     Pilonidal cyst 1968      Past Surgical History:   Procedure Laterality Date    HX APPENDECTOMY  1960    HX CYST REMOVAL      HX DILATION AND CURETTAGE  11/00    HX GYN  0304,3554    exp laparoscopy for endometriosis    HX KNEE REPLACEMENT  9/10/10    right    HX ORTHOPAEDIC      r knee arthroscopy    HX ORTHOPAEDIC  12/01    left thumb and forearm surg    ND BREAST SURGERY PROCEDURE UNLISTED  10/09    benign lumpectomy - left     Current Outpatient Medications   Medication Sig Dispense Refill    varicella-zoster recombinant, PF, (Shingrix, PF,) 50 mcg/0.5 mL susr injection 0.5mL by IntraMUSCular route once now and then repeat in 2-6 months 0.5 mL 1    diph,pertuss,acel,,tetanus vac,PF, (ADACEL) 2 Lf-(2.5-5-3-5 mcg)-5Lf/0.5 mL syrg vaccine 0.5 mL by IntraMUSCular route once for 1 dose. 0.5 mL 0    losartan-hydroCHLOROthiazide (HYZAAR) 50-12.5 mg per tablet Take 1 Tablet by mouth daily. 30 Tablet 0    sertraline (ZOLOFT) 50 mg tablet TAKE 1 TABLET BY MOUTH EVERY DAY (Patient taking differently: TAKE 1/2 TABLET BY MOUTH EVERY DAY) 90 Tablet 3    carbidopa-levodopa (SINEMET)  mg per tablet TAKE 2 TAB BY MOUTH THREE TIMES A DAY FOLLOW DIRECTIONS GIVEN IN [facility]  3    cholecalciferol (VITAMIN D3) 1,000 unit cap Take 1,000 Units by mouth daily.       Synthroid 100 mcg tablet TAKE 1 TABLET BY MOUTH EVERY DAY BEFORE BREAKFAST AND TAKE 1 AND 1/2 TABS ON SUNDAYS. (Patient taking differently: TAKE 1 TABLET BY MOUTH EVERY DAY BEFORE BREAKFAST AND TAKE 1/2 TABS ON SUNDAYS.) 96 Tablet 4    rOPINIRole (REQUIP) 1 mg tablet TAKE 1 TABLET BY MOUTH EVERYDAY AT BEDTIME (Patient not taking: Reported on 7/8/2022)       Allergies   Allergen Reactions    Latex Unknown (comments)    Augmentin [Amoxicillin-Pot Clavulanate] Diarrhea     She can tolerate Amoxicillin    Erythromycin Unknown (comments)    Motrin [Ibuprofen] Swelling     legs       Family History   Problem Relation Age of Onset    Hypertension Mother     Heart Attack Mother 80    COPD Father     Dementia Father         alzeihmers    Other Father         PUD     Social History     Tobacco Use    Smoking status: Never Smoker    Smokeless tobacco: Never Used   Substance Use Topics    Alcohol use: No     Alcohol/week: 0.0 standard drinks         Stu Hilton MD

## 2022-07-08 ENCOUNTER — OFFICE VISIT (OUTPATIENT)
Dept: INTERNAL MEDICINE CLINIC | Age: 72
End: 2022-07-08
Payer: MEDICARE

## 2022-07-08 VITALS
RESPIRATION RATE: 16 BRPM | DIASTOLIC BLOOD PRESSURE: 75 MMHG | SYSTOLIC BLOOD PRESSURE: 140 MMHG | WEIGHT: 134.2 LBS | OXYGEN SATURATION: 98 % | HEIGHT: 65 IN | BODY MASS INDEX: 22.36 KG/M2 | HEART RATE: 64 BPM | TEMPERATURE: 98.3 F

## 2022-07-08 DIAGNOSIS — Z12.11 SCREEN FOR COLON CANCER: ICD-10-CM

## 2022-07-08 DIAGNOSIS — K59.00 CONSTIPATION, UNSPECIFIED CONSTIPATION TYPE: ICD-10-CM

## 2022-07-08 DIAGNOSIS — I10 ESSENTIAL HYPERTENSION: ICD-10-CM

## 2022-07-08 DIAGNOSIS — Z00.00 MEDICARE ANNUAL WELLNESS VISIT, SUBSEQUENT: ICD-10-CM

## 2022-07-08 DIAGNOSIS — E78.9 BORDERLINE HIGH CHOLESTEROL: ICD-10-CM

## 2022-07-08 DIAGNOSIS — G20 PARKINSON DISEASE (HCC): ICD-10-CM

## 2022-07-08 DIAGNOSIS — I10 ESSENTIAL HYPERTENSION: Primary | ICD-10-CM

## 2022-07-08 DIAGNOSIS — E03.9 ACQUIRED HYPOTHYROIDISM: ICD-10-CM

## 2022-07-08 DIAGNOSIS — Z71.89 ADVANCE DIRECTIVE DISCUSSED WITH PATIENT: ICD-10-CM

## 2022-07-08 DIAGNOSIS — Z12.31 ENCOUNTER FOR SCREENING MAMMOGRAM FOR MALIGNANT NEOPLASM OF BREAST: ICD-10-CM

## 2022-07-08 DIAGNOSIS — Z78.0 POSTMENOPAUSAL: ICD-10-CM

## 2022-07-08 DIAGNOSIS — F33.0 MILD EPISODE OF RECURRENT MAJOR DEPRESSIVE DISORDER (HCC): ICD-10-CM

## 2022-07-08 DIAGNOSIS — E55.9 VITAMIN D DEFICIENCY: ICD-10-CM

## 2022-07-08 PROCEDURE — G8399 PT W/DXA RESULTS DOCUMENT: HCPCS | Performed by: INTERNAL MEDICINE

## 2022-07-08 PROCEDURE — G8753 SYS BP > OR = 140: HCPCS | Performed by: INTERNAL MEDICINE

## 2022-07-08 PROCEDURE — G9899 SCRN MAM PERF RSLTS DOC: HCPCS | Performed by: INTERNAL MEDICINE

## 2022-07-08 PROCEDURE — G8754 DIAS BP LESS 90: HCPCS | Performed by: INTERNAL MEDICINE

## 2022-07-08 PROCEDURE — G8420 CALC BMI NORM PARAMETERS: HCPCS | Performed by: INTERNAL MEDICINE

## 2022-07-08 PROCEDURE — G8427 DOCREV CUR MEDS BY ELIG CLIN: HCPCS | Performed by: INTERNAL MEDICINE

## 2022-07-08 PROCEDURE — 99214 OFFICE O/P EST MOD 30 MIN: CPT | Performed by: INTERNAL MEDICINE

## 2022-07-08 PROCEDURE — 1090F PRES/ABSN URINE INCON ASSESS: CPT | Performed by: INTERNAL MEDICINE

## 2022-07-08 PROCEDURE — 1101F PT FALLS ASSESS-DOCD LE1/YR: CPT | Performed by: INTERNAL MEDICINE

## 2022-07-08 PROCEDURE — G8536 NO DOC ELDER MAL SCRN: HCPCS | Performed by: INTERNAL MEDICINE

## 2022-07-08 PROCEDURE — G9717 DOC PT DX DEP/BP F/U NT REQ: HCPCS | Performed by: INTERNAL MEDICINE

## 2022-07-08 PROCEDURE — 3017F COLORECTAL CA SCREEN DOC REV: CPT | Performed by: INTERNAL MEDICINE

## 2022-07-08 PROCEDURE — G0439 PPPS, SUBSEQ VISIT: HCPCS | Performed by: INTERNAL MEDICINE

## 2022-07-08 RX ORDER — ZOSTER VACCINE RECOMBINANT, ADJUVANTED 50 MCG/0.5
KIT INTRAMUSCULAR
Qty: 0.5 ML | Refills: 1 | Status: SHIPPED | OUTPATIENT
Start: 2022-07-08

## 2022-07-08 NOTE — PATIENT INSTRUCTIONS
Medicare Wellness Visit, Female     The best way to live healthy is to have a lifestyle where you eat a well-balanced diet, exercise regularly, limit alcohol use, and quit all forms of tobacco/nicotine, if applicable. Regular preventive services are another way to keep healthy. Preventive services (vaccines, screening tests, monitoring & exams) can help personalize your care plan, which helps you manage your own care. Screening tests can find health problems at the earliest stages, when they are easiest to treat. [facility] follows the current, evidence-based guidelines published by the [facility] Jonathan Francois ([facility]) when recommending preventive services for our patients. Because we follow these guidelines, sometimes recommendations change over time as research supports it. (For example, mammograms used to be recommended annually. Even though Medicare will still pay for an annual mammogram, the newer guidelines recommend a mammogram every two years for women of average risk). Of course, you and your doctor may decide to screen more often for some diseases, based on your risk and your co-morbidities (chronic disease you are already diagnosed with). Preventive services for you include:  - Medicare offers their members a free annual wellness visit, which is time for you and your primary care provider to discuss and plan for your preventive service needs. Take advantage of this benefit every year!  -All adults over the age of 72 should receive the recommended pneumonia vaccines. Current USPSTF guidelines recommend a series of two vaccines for the best pneumonia protection.   -All adults should have a flu vaccine yearly and a tetanus vaccine every 10 years.   -All adults age 48 and older should receive the shingles vaccines (series of two vaccines).       -All adults age 38-68 who are overweight should have a diabetes screening test once every three years.   -All adults born between [birth year] and 1966 should be screened once for Hepatitis C.  -Other screening tests and preventive services for persons with diabetes include: an eye exam to screen for diabetic retinopathy, a kidney function test, a foot exam, and stricter control over your cholesterol.   -Cardiovascular screening for adults with routine risk involves an electrocardiogram (ECG) at intervals determined by your doctor.   -Colorectal cancer screenings should be done for adults age 54-65 with no increased risk factors for colorectal cancer. There are a number of acceptable methods of screening for this type of cancer. Each test has its own benefits and drawbacks. Discuss with your doctor what is most appropriate for you during your annual wellness visit. The different tests include: colonoscopy (considered the best screening method), a fecal occult blood test, a fecal DNA test, and sigmoidoscopy.    -A bone mass density test is recommended when a woman turns 65 to screen for osteoporosis. This test is only recommended one time, as a screening. Some providers will use this same test as a disease monitoring tool if you already have osteoporosis. -Breast cancer screenings are recommended every other year for women of normal risk, age 54-69.  -Cervical cancer screenings for women over age 72 are only recommended with certain risk factors. Here is a list of your current Health Maintenance items (your personalized list of preventive services) with a due date:  Health Maintenance Due   Topic Date Due    COVID-19 Vaccine (1) Never done    DTaP/Tdap/Td  (1 - Tdap) Never done    Shingles Vaccine (1 of 2) Never done    Mammogram  09/26/2018    Depression Monitoring  06/08/2022    Colorectal Screening  07/09/2022       Increase Sertraline to 50mg 1 full tablet daily. Docusate (Colace) 1 tablet daily.

## 2022-07-08 NOTE — PROGRESS NOTES
Chief Complaint   Patient presents with    Hypertension     follow up    Other     c/o diffculty swallowing       Vitals:    07/08/22 1004   BP: (!) 148/79   Pulse: 64   Resp: 16   Temp: 98.3 °F (36.8 °C)   TempSrc: Temporal   SpO2: 98%   Weight: 134 lb 3.2 oz (60.9 kg)   Height: 5' 5\" (1.651 m)   PainSc:   0 - No pain       Health Maintenance Due   Topic    COVID-19 Vaccine (1)    DTaP/Tdap/Td series (1 - Tdap)    Shingrix Vaccine Age 49> (1 of 2)    Breast Cancer Screen Mammogram     Medicare Yearly Exam     Depression Monitoring     Colorectal Cancer Screening Combo        1. \"Have you been to the ER, urgent care clinic since your last visit? Hospitalized since your last visit? \" No    2. \"Have you seen or consulted any other health care providers outside of the [address] [place] [address] since your last visit? \" No     3. For patients aged 39-70: Has the patient had a colonoscopy / FIT/ Cologuard? Yes - no Care Gap present      If the patient is female:    4. For patients aged 41-77: Has the patient had a mammogram within the past 2 years? No      5. For patients aged 21-65: Has the patient had a pap smear?  NA - based on age or sex

## 2022-07-09 LAB
25(OH)D3 SERPL-MCNC: 29.4 NG/ML (ref 30–100)
ALBUMIN SERPL-MCNC: 3.9 G/DL (ref 3.5–5)
ALBUMIN/GLOB SERPL: 1.3 {RATIO} (ref 1.1–2.2)
ALP SERPL-CCNC: 98 U/L (ref 45–117)
ALT SERPL-CCNC: 9 U/L (ref 12–78)
ANION GAP SERPL CALC-SCNC: 3 MMOL/L (ref 5–15)
AST SERPL-CCNC: 17 U/L (ref 15–37)
BILIRUB SERPL-MCNC: 0.8 MG/DL (ref 0.2–1)
BUN SERPL-MCNC: 29 MG/DL (ref 6–20)
BUN/CREAT SERPL: 27 (ref 12–20)
CALCIUM SERPL-MCNC: 10.3 MG/DL (ref 8.5–10.1)
CHLORIDE SERPL-SCNC: 103 MMOL/L (ref 97–108)
CHOLEST SERPL-MCNC: 154 MG/DL
CO2 SERPL-SCNC: 31 MMOL/L (ref 21–32)
CREAT SERPL-MCNC: 1.06 MG/DL (ref 0.55–1.02)
GLOBULIN SER CALC-MCNC: 3.1 G/DL (ref 2–4)
GLUCOSE SERPL-MCNC: 100 MG/DL (ref 65–100)
HDLC SERPL-MCNC: 72 MG/DL
HDLC SERPL: 2.1 {RATIO} (ref 0–5)
LDLC SERPL CALC-MCNC: 69 MG/DL (ref 0–100)
POTASSIUM SERPL-SCNC: 4.3 MMOL/L (ref 3.5–5.1)
PROT SERPL-MCNC: 7 G/DL (ref 6.4–8.2)
SODIUM SERPL-SCNC: 137 MMOL/L (ref 136–145)
T4 FREE SERPL-MCNC: 1.5 NG/DL (ref 0.8–1.5)
TRIGL SERPL-MCNC: 65 MG/DL (ref ?–150)
TSH SERPL DL<=0.05 MIU/L-ACNC: 0.55 UIU/ML (ref 0.36–3.74)
VLDLC SERPL CALC-MCNC: 13 MG/DL

## 2022-07-19 RX ORDER — LOSARTAN POTASSIUM AND HYDROCHLOROTHIAZIDE 12.5; 5 MG/1; MG/1
TABLET ORAL
Qty: 90 TABLET | Refills: 3 | Status: SHIPPED | OUTPATIENT
Start: 2022-07-19

## 2022-10-16 RX ORDER — SERTRALINE HYDROCHLORIDE 50 MG/1
TABLET, FILM COATED ORAL
Qty: 90 TABLET | Refills: 3 | Status: SHIPPED | OUTPATIENT
Start: 2022-10-16

## 2022-12-29 ENCOUNTER — TELEPHONE (OUTPATIENT)
Dept: INTERNAL MEDICINE CLINIC | Age: 72
End: 2022-12-29

## 2022-12-29 NOTE — TELEPHONE ENCOUNTER
[facility] and [facility] have [facility] the patient's neurologist and will get back to us if anything further is needed.

## 2022-12-29 NOTE — TELEPHONE ENCOUNTER
Temo Monsivais called from [place] [address] in regards to Dr. Abena Banks patient. She wanted to know if Dr. Abena Banks will be following this patient and will be signing off on her paperwork. I informed her that Dr. Abena Banks will be back in the face at the end of Jan. She asked if another provider can sign off on her paperwork until Dr. Abena Banks comes back into the office. She stated that the facility cannot see the patient until Dr. Abena Banks or another provider signs off on her paperwork. I informed her that I will let the [facility] know and reach out to answer her questions and concerns.      Telephone number [phone]

## 2023-01-26 ENCOUNTER — TELEPHONE (OUTPATIENT)
Dept: INTERNAL MEDICINE CLINIC | Age: 73
End: 2023-01-26

## 2023-01-26 NOTE — TELEPHONE ENCOUNTER
Karla Lui from [employer] called in requesting the provider to complete an order for the patient to get evaluate for . He stated that the patient is having a hard time caring for themselves and need help. I informed Karla Lui to fax over a request form and I will send the message over to the nurse.

## 2023-02-01 ENCOUNTER — TELEPHONE (OUTPATIENT)
Dept: INTERNAL MEDICINE CLINIC | Age: 73
End: 2023-02-01

## 2023-02-06 ENCOUNTER — OFFICE VISIT (OUTPATIENT)
Dept: INTERNAL MEDICINE CLINIC | Age: 73
End: 2023-02-06
Payer: MEDICARE

## 2023-02-06 VITALS
DIASTOLIC BLOOD PRESSURE: 85 MMHG | HEART RATE: 78 BPM | RESPIRATION RATE: 18 BRPM | OXYGEN SATURATION: 97 % | WEIGHT: 123.2 LBS | HEIGHT: 65 IN | BODY MASS INDEX: 20.53 KG/M2 | TEMPERATURE: 98.1 F | SYSTOLIC BLOOD PRESSURE: 133 MMHG

## 2023-02-06 DIAGNOSIS — E03.9 ACQUIRED HYPOTHYROIDISM: ICD-10-CM

## 2023-02-06 DIAGNOSIS — Z09 HOSPITAL DISCHARGE FOLLOW-UP: ICD-10-CM

## 2023-02-06 DIAGNOSIS — M79.89 LEG SWELLING: ICD-10-CM

## 2023-02-06 DIAGNOSIS — Z09 HOSPITAL DISCHARGE FOLLOW-UP: Primary | ICD-10-CM

## 2023-02-06 DIAGNOSIS — I10 ESSENTIAL HYPERTENSION: ICD-10-CM
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
## 2023-02-06 DIAGNOSIS — F41.9 ANXIETY: ICD-10-CM

## 2023-02-06 DIAGNOSIS — G20 PARKINSON DISEASE (HCC): ICD-10-CM

## 2023-02-06 DIAGNOSIS — F33.0 MILD EPISODE OF RECURRENT MAJOR DEPRESSIVE DISORDER (HCC): ICD-10-CM

## 2023-02-06 RX ORDER — DONEPEZIL HYDROCHLORIDE 5 MG/1
5 TABLET, FILM COATED ORAL DAILY
COMMUNITY
Start: 2022-12-29

## 2023-02-06 RX ORDER — DICLOFENAC SODIUM 10 MG/G
GEL TOPICAL
COMMUNITY
Start: 2022-12-29

## 2023-02-06 RX ORDER — LOSARTAN POTASSIUM 50 MG/1
50 TABLET ORAL DAILY
COMMUNITY
End: 2023-02-06

## 2023-02-06 RX ORDER — DULOXETIN HYDROCHLORIDE 20 MG/1
20 CAPSULE, DELAYED RELEASE ORAL DAILY
Qty: 90 CAPSULE | Refills: 0 | Status: SHIPPED | OUTPATIENT
Start: 2023-02-06 | End: 2023-05-07

## 2023-02-06 RX ORDER — DULOXETIN HYDROCHLORIDE 20 MG/1
20 CAPSULE, DELAYED RELEASE ORAL DAILY
COMMUNITY
Start: 2022-12-29 | End: 2023-02-06 | Stop reason: SDUPTHER

## 2023-02-06 NOTE — PROGRESS NOTES
Varghese Demarco is a 67 y.o. female  Chief Complaint   Patient presents with    Hospital Follow Up     Pt states she wants her medications reviewed. She was prescribed 3 antibiotics but has only been on one because she hasnt been able to get all meds from [facility]    GERD     Pt states she has reflux and may need referral to GI    Ankle swelling     Pt states lower leg and ankles have been swelling more and more     Visit Vitals  /85 (BP 1 Location: Right upper arm, BP Patient Position: Sitting, BP Cuff Size: Adult)   Pulse 78   Temp 98.1 °F (36.7 °C) (Temporal)   Resp 18   Ht 5' 5\" (1.651 m)   Wt 123 lb 3.2 oz (55.9 kg)   SpO2 97%   BMI 20.50 kg/m²        HPI  Ms. Varghese Demarco is a 67 y.o. female who presents with her friend after hospitalization/rehab. Patient was hospitalized after Hospitalizations. She was at [facility] for a fall and she was found to have UTI and stayed in hospital for about 2 week before transferring to [facility] for rehab. She was discharged from Rehab around January 23 and she has home PT/OT at home. Patient appears to have anxiety and depression with tearful affect. She mentions about loosing her mother in January and she is currently living with her  who has multiple comorbidities and that causes anxiety about what is going to come next. She has not had a since hospitalization, she ambulated with a walker, she has some support from friends in her neighborhood. She still feels week and is afraid of falling. Patient has lower extremity swelling, left >right that has worsened recently. Patient was on HCTZ-losartan combination medication and that was changed to losartan at the last hospitalization. However, she was not taking most of her medication for about 2 weeks after discharge from Rehab. Patient also was discharged on sodium tablet and she doesn't know the reason.       Hospital discharge: will get discharge summary from [facility]. It appears like her SSRI and losartan-hctz was stopped and now placed on losartan. I am suspecting SSRI and losartan-hctz were stopped because of hyponatremia. She is a little depressed because of recent changes, will restart duloxetine. Patient was on losartan during rehab, but she was not on medication since discharge, she is working with physical therapy, her blood pressures has been good today and she has risk of falling, will continue to hold blood pressure medications and monitor daily and follow up in 1 month. Will get CMP and TSH on this visit. Patient was also discharged on Aricept and will continue aricept given history of Dementia and follow up with her Neurology. Parkinson's /dementia: recent fall requiring hospitalization,  she reports occasional visual hallucinations and know her medical problems can cause such symptoms. continue the current medication. Sees Dr. Micaela Rowan. Hypothyroid: stable, will continue current dose, obtaining TSH     HTN: was not on BP meds for 2 weeks, blood pressure is in good control, denies chest pain, shortness of breath. Reports edema. Past Medical History:   Diagnosis Date    Fibrocystic breast disease     Hypertension     Hypothyroid 2/1998    s/p I131 treatment - [facility]    IBS (irritable bowel syndrome)     POP on CPAP     4mm    Parkinson's disease ([facility] [address]     Pilonidal cyst 1968            ROS  Review of Systems   Constitutional:  Negative for chills and fever. HENT:  Negative for hearing loss. Eyes:  Negative for blurred vision and double vision. Respiratory:  Negative for cough. Cardiovascular:  Positive for leg swelling. Negative for chest pain, palpitations and orthopnea. Gastrointestinal:  Negative for heartburn. Genitourinary:  Negative for dysuria and urgency. Musculoskeletal:  Negative for myalgias. Skin:  Negative for rash. Neurological:  Positive for dizziness. Psychiatric/Behavioral:  Positive for depression.     All other systems reviewed and are negative. EXAM  Physical Exam  Vitals reviewed. Constitutional:       Appearance: Normal appearance. HENT:      Head: Normocephalic and atraumatic. Cardiovascular:      Rate and Rhythm: Normal rate and regular rhythm. Pulses: Normal pulses. Heart sounds: Normal heart sounds. No murmur heard. Pulmonary:      Effort: Pulmonary effort is normal. No respiratory distress. Breath sounds: Normal breath sounds. Musculoskeletal:      Right lower leg: Edema present. Left lower leg: Edema present. Neurological:      Mental Status: She is alert. Psychiatric:         Mood and Affect: Affect is tearful. Health Maintenance Due   Topic Date Due    DTaP/Tdap/Td series (1 - Tdap) Never done    Shingles Vaccine (1 of 2) Never done    Breast Cancer Screen Mammogram  09/26/2018    COVID-19 Vaccine (4 - Booster) 11/23/2021    Colorectal Cancer Screening Combo  07/09/2022    Flu Vaccine (1) 08/01/2022       ASSESSMENT/PLAN    Diagnoses and all orders for this visit:    1. Hospital discharge follow-up  Comments:  stable, improved, no falls since discharge   doing home PT   Orders:  -     METABOLIC PANEL, COMPREHENSIVE; Future  -     TSH 3RD GENERATION; Future  -     UT DISCHRG MEDS RECONCILED W/CURRENT MED LIST    2. Essential hypertension  Comments:  monitor blood pressure at home   will get CMP, continue to hold blood pressure meds for now     3. Acquired hypothyroidism  -     TSH 3RD GENERATION; Future  -     T4 (THYROXINE); Future    4. Leg swelling  Comments:  appears dependent edema, it is asymetrical swelling, will get d dimer and get Ultrasound if needed,   elevate legs, try compression stocking during the day   Orders:  -     D DIMER; Future    5. Fall, initial encounter  Comments:  continue home PT       6. Mild episode of recurrent major depressive disorder (HCC)  -     DULoxetine (CYMBALTA) 20 mg capsule; Take 1 Capsule by mouth daily for 90 days.     7. [facility]  Comments:  cont current mes   f/u with neurology     8.  Marvin Fan MD

## 2023-02-06 NOTE — PROGRESS NOTES
Chief Complaint   Patient presents with    Hospital Follow Up     Pt states she wants her medications reviewed. She was prescribed 3 antibiotics but has only been on one because she hasnt been able to get all meds from [facility]    GERD     Pt states she has reflux and may need referral to GI    Ankle swelling     Pt states lower leg and ankles have been swelling more and more          Vitals:    02/06/23 1030   BP: 133/85   Pulse: 78   Resp: 18   Temp: 98.1 °F (36.7 °C)   TempSrc: Temporal   SpO2: 97%   Weight: 123 lb 3.2 oz (55.9 kg)   Height: 5' 5\" (1.651 m)   PainSc:   7   PainLoc: Back       Current Outpatient Medications on File Prior to Visit   Medication Sig Dispense Refill    Synthroid 100 mcg tablet TAKE 1 TABLET BY MOUTH EVERY DAY BEFORE BREAKFAST AND TAKE 1/2 TABS ON SUNDAYS. 90 Tablet 3    carbidopa-levodopa (SINEMET)  mg per tablet TAKE 2 TAB BY MOUTH THREE TIMES A DAY FOLLOW DIRECTIONS GIVEN IN [facility]  3    DULoxetine (CYMBALTA) 20 mg capsule Take 20 mg by mouth daily. (Patient not taking: Reported on 2/6/2023)      donepeziL (ARICEPT) 5 mg tablet Take 5 mg by mouth daily. (Patient not taking: Reported on 2/6/2023)      diclofenac (VOLTAREN) 1 % gel APPLY TO AFFECTED AREA 3 TIMES A DAY (Patient not taking: Reported on 2/6/2023)      sertraline (ZOLOFT) 50 mg tablet TAKE 1 TABLET BY MOUTH EVERY DAY (Patient not taking: Reported on 2/6/2023) 90 Tablet 3    losartan-hydroCHLOROthiazide (HYZAAR) 50-12.5 mg per tablet TAKE 1 TABLET BY MOUTH EVERY DAY (Patient not taking: Reported on 2/6/2023) 90 Tablet 3    varicella-zoster recombinant, PF, (Shingrix, PF,) 50 mcg/0.5 mL susr injection 0.5mL by IntraMUSCular route once now and then repeat in 2-6 months (Patient not taking: Reported on 2/6/2023) 0.5 mL 1    rOPINIRole (REQUIP) 1 mg tablet TAKE 1 TABLET BY MOUTH EVERYDAY AT BEDTIME (Patient not taking: No sig reported)      cholecalciferol (VITAMIN D3) 25 mcg (1,000 unit) cap Take 1,000 Units by mouth daily. (Patient not taking: Reported on 2/6/2023)       No current facility-administered medications on file prior to visit. Health Maintenance Due   Topic    DTaP/Tdap/Td series (1 - Tdap)    Shingles Vaccine (1 of 2)    Breast Cancer Screen Mammogram     COVID-19 Vaccine (4 - Booster)    Colorectal Cancer Screening Combo     Flu Vaccine (1)       1. \"Have you been to the ER, urgent care clinic since your last visit? Hospitalized since your last visit? \" Yes [facility]    2. \"Have you seen or consulted any other health care providers outside of the [address] [place] [address] since your last visit? \" No     3. For patients aged 39-70: Has the patient had a colonoscopy / FIT/ Cologuard? Yes - no Care Gap present      If the patient is female:    4. For patients aged 41-77: Has the patient had a mammogram within the past 2 years? No      5. For patients aged 21-65: Has the patient had a pap smear?  NA - based on age or sex

## 2023-02-07 LAB
ALBUMIN SERPL-MCNC: 3.8 G/DL (ref 3.5–5)
ALBUMIN/GLOB SERPL: 1 (ref 1.1–2.2)
ALP SERPL-CCNC: 101 U/L (ref 45–117)
ALT SERPL-CCNC: 14 U/L (ref 12–78)
ANION GAP SERPL CALC-SCNC: 3 MMOL/L (ref 5–15)
AST SERPL-CCNC: 16 U/L (ref 15–37)
BILIRUB SERPL-MCNC: 0.7 MG/DL (ref 0.2–1)
BUN SERPL-MCNC: 25 MG/DL (ref 6–20)
BUN/CREAT SERPL: 26 (ref 12–20)
CALCIUM SERPL-MCNC: 10.6 MG/DL (ref 8.5–10.1)
CHLORIDE SERPL-SCNC: 106 MMOL/L (ref 97–108)
CO2 SERPL-SCNC: 30 MMOL/L (ref 21–32)
CREAT SERPL-MCNC: 0.97 MG/DL (ref 0.55–1.02)
D DIMER PPP FEU-MCNC: 0.87 MG/L FEU (ref 0–0.65)
GLOBULIN SER CALC-MCNC: 3.7 G/DL (ref 2–4)
GLUCOSE SERPL-MCNC: 98 MG/DL (ref 65–100)
POTASSIUM SERPL-SCNC: 4.3 MMOL/L (ref 3.5–5.1)
PROT SERPL-MCNC: 7.5 G/DL (ref 6.4–8.2)
SODIUM SERPL-SCNC: 139 MMOL/L (ref 136–145)
T4 SERPL-MCNC: 12.4 UG/DL (ref 4.8–13.9)
TSH SERPL DL<=0.05 MIU/L-ACNC: 0.97 UIU/ML (ref 0.36–3.74)

## 2023-02-08 NOTE — PROGRESS NOTES
Results indicate slightly elevated D dimer and the abnormality needs to be followed by ultrasound of your legs. I will put the order in and someone will call you to schedule the time for Ultrasound.

## 2023-02-08 NOTE — PROGRESS NOTES
Called Patient cell number [phone] voice mail not set up yet, unable to leave message. Called patient home phone [phone] and answered. Patient requested a callback at later time.

## 2023-02-08 NOTE — PROGRESS NOTES
Tried to call patient, unable to leave voice message.  8:11 AM      Signed By: Shani Triplett    February 8, 2023

## 2023-02-10 NOTE — TELEPHONE ENCOUNTER
Attempted to reach patient for the following per Dr. Rosie Garcia:     Results indicate slightly elevated D dimer and the abnormality needs to be followed by ultrasound of your legs. I will put the order in and someone will call you to schedule the time for Ultrasound. Tried all numbers, [phone] Mailbox full, [phone], Mailbox full, [phone] not set up, work number patient no longer works there.

## 2023-02-10 NOTE — TELEPHONE ENCOUNTER
----- Message from Queen Sergey MD sent at 2/8/2023  7:36 AM EST -----  Results indicate slightly elevated D dimer and the abnormality needs to be followed by ultrasound of your legs. I will put the order in and someone will call you to schedule the time for Ultrasound.

## 2023-02-24 RX ORDER — DONEPEZIL HYDROCHLORIDE 5 MG/1
5 TABLET, FILM COATED ORAL DAILY
Qty: 90 TABLET | Refills: 3 | Status: SHIPPED | OUTPATIENT
Start: 2023-02-24

## 2023-02-27 RX ORDER — DONEPEZIL HYDROCHLORIDE 5 MG/1
5 TABLET, FILM COATED ORAL DAILY
Qty: 90 TABLET | Refills: 3 | Status: SHIPPED | OUTPATIENT
Start: 2023-02-27

## 2023-02-27 NOTE — TELEPHONE ENCOUNTER
Response Requested:    Reason for Request: Pt new RX    Pharmacy Comment: Pt requests new RX: Pt requests new RX for Donezepil

## 2023-03-01 ENCOUNTER — TELEPHONE (OUTPATIENT)
Dept: INTERNAL MEDICINE CLINIC | Age: 73
End: 2023-03-01

## 2023-03-01 NOTE — TELEPHONE ENCOUNTER
Emma with [facility] is calling to say that patient has been experiencing swelling in lower extremities for at least 3 weeks. Nurse Emma has been trying to get her to elevate her legs, but pt is not interested in doing this. Emma thinks she may need a diuretic. She wants to know what Dr. Irasema Mann would recommend.

## 2023-03-02 RX ORDER — FUROSEMIDE 20 MG/1
20 TABLET ORAL DAILY
Qty: 30 TABLET | Refills: 0 | Status: SHIPPED | OUTPATIENT
Start: 2023-03-02

## 2023-03-02 NOTE — TELEPHONE ENCOUNTER
Please call back home health    I sent in a prescriptio for LASIX 20mg to take  in the am.  She needs to elevate her legs. She needs otc knee high compression hose to wear daily as well.

## 2023-03-03 NOTE — TELEPHONE ENCOUNTER
Spoke with Sydney from  [facility] to give instruction/information per Dr. Magaly Khan as follows:     I sent in a prescriptio for LASIX 20mg to take  in the am.  She needs to elevate her legs. She needs otc knee high compression hose to wear daily as well.

## 2023-03-13 ENCOUNTER — TELEPHONE (OUTPATIENT)
Dept: INTERNAL MEDICINE CLINIC | Age: 73
End: 2023-03-13

## 2023-03-13 NOTE — TELEPHONE ENCOUNTER
----- Message from Jamarcus Woodall sent at 3/13/2023  4:32 PM EDT -----  Subject: Message to Provider    QUESTIONS  Information for Provider? Pt called to schedule hospital/ rehab follow up. Tried to contact office. Pt disconnected from the line but would like appt   asap. Please contact pt to schedule   ---------------------------------------------------------------------------  --------------  José Negro INFO  [phone]; OK to leave message on voicemail  ---------------------------------------------------------------------------  --------------  SCRIPT ANSWERS  Relationship to Patient?  Self

## 2023-03-17 ENCOUNTER — TELEPHONE (OUTPATIENT)
Dept: INTERNAL MEDICINE CLINIC | Age: 73
End: 2023-03-17

## 2023-03-17 NOTE — TELEPHONE ENCOUNTER
Called pt and could not lvm due to ALL number mailbox was full. Needs ro r/s appt 3/20. \"Pt needs to reschedule her follow up   appt for 03/20, she needs at least 3 days notice for transportation   through her insurance.  Try both numbers on file if cannot reach her \"

## 2023-03-21 NOTE — TELEPHONE ENCOUNTER
Called every number on chart, one let me leave message. Left vcm telling pt I would cancel yesterday's appt but she does need to call back to reschedule.

## 2023-03-28 ENCOUNTER — TELEPHONE (OUTPATIENT)
Dept: INTERNAL MEDICINE CLINIC | Age: 73
End: 2023-03-28

## 2023-03-28 NOTE — TELEPHONE ENCOUNTER
Tom Samuels with [facility] needs a verbal order for pt to have a mental health evaluation done. She has been feeling down and depressed mother passed over holidays and  has been in and out hospital.   Tom Samuels is requesting a call back from  at [phone].

## 2023-05-15 ENCOUNTER — NURSE TRIAGE (OUTPATIENT)
Dept: OTHER | Facility: CLINIC | Age: 73
End: 2023-05-15

## 2023-05-15 NOTE — TELEPHONE ENCOUNTER
Location of patient: [address] saab from [facility], [place], operated by [facility] with [employer]. Subjective: Caller states \"I have not seen a dentist in a while but they are going to see me on Thursday at 10:45\"     Current Symptoms:     swollen gums on the left side (upper and lower), gum pain (cannot get patient to rate pain), can feel something poking my tongue/cheek, no sore throat, no fever, \"I just do not feel great\"    Having a reflux issue, feels a choking sensation, abdominal pain - cramping (constipation pain), intermittent nausea, brain fogginess    Onset: Reflux symptoms are on-going    Associated Symptoms: increased wakefulness    Pain Severity: 0/10; N/A; none    Temperature: denies       What has been tried: Nothing    LMP: NA Pregnant: No    Recommended disposition: See in Office Within 2 Weeks    Care advice provided, patient verbalizes understanding; denies any other questions or concerns; instructed to call back for any new or worsening symptoms. Patient/Caller agrees with recommended disposition; writer provided warm transfer to [facility], [place], operated by [facility] for appointment scheduling    Attention Provider: Thank you for allowing me to participate in the care of your patient. The patient was connected to triage in response to information provided to the ECC/PSC. Please do not respond through this encounter as the response is not directed to a shared pool.       Reason for Disposition   Abdominal pains regularly occur about 1 hour after meals    Protocols used: Abdominal Pain - Upper-ADULT-

## 2023-05-16 ENCOUNTER — TELEPHONE (OUTPATIENT)
Age: 73
End: 2023-05-16

## 2023-05-16 NOTE — TELEPHONE ENCOUNTER
Called pt and was unable to lvm. Pt needs a appt. \"Pt would only like to see Dr. Aide Ortega. Pt is return from Nurse Triage for acid reflux symptoms. Burning, interim nausea, feels like she is in someone else's body, feels   like she is in a fog. Feels like she is having anxiety, dealing with her   moms passing. Please call pt with appt options. Pt would like an appt the   week of 05/22. Please call pt to set up appt. Please call twice if no   answer.  \"

## 2023-05-22 ENCOUNTER — OFFICE VISIT (OUTPATIENT)
Age: 73
End: 2023-05-22
Payer: MEDICARE

## 2023-05-22 ENCOUNTER — NURSE TRIAGE (OUTPATIENT)
Dept: OTHER | Facility: CLINIC | Age: 73
End: 2023-05-22

## 2023-05-22 VITALS
WEIGHT: 123.2 LBS | HEIGHT: 65 IN | DIASTOLIC BLOOD PRESSURE: 65 MMHG | BODY MASS INDEX: 20.53 KG/M2 | HEART RATE: 71 BPM | SYSTOLIC BLOOD PRESSURE: 114 MMHG | RESPIRATION RATE: 20 BRPM | TEMPERATURE: 96.5 F | OXYGEN SATURATION: 98 %

## 2023-05-22 DIAGNOSIS — F41.9 ANXIETY AND DEPRESSION: ICD-10-CM

## 2023-05-22 DIAGNOSIS — R53.83 OTHER FATIGUE: ICD-10-CM

## 2023-05-22 DIAGNOSIS — F41.9 ANXIETY AND DEPRESSION: Primary | ICD-10-CM

## 2023-05-22 DIAGNOSIS — Z91.81 AT HIGH RISK FOR FALLS: ICD-10-CM

## 2023-05-22 DIAGNOSIS — R13.12 OROPHARYNGEAL DYSPHAGIA: ICD-10-CM

## 2023-05-22 DIAGNOSIS — F32.A ANXIETY AND DEPRESSION: Primary | ICD-10-CM

## 2023-05-22 DIAGNOSIS — F32.A ANXIETY AND DEPRESSION: ICD-10-CM

## 2023-05-22 PROCEDURE — 99213 OFFICE O/P EST LOW 20 MIN: CPT | Performed by: INTERNAL MEDICINE

## 2023-05-22 PROCEDURE — 1090F PRES/ABSN URINE INCON ASSESS: CPT | Performed by: INTERNAL MEDICINE

## 2023-05-22 PROCEDURE — 3078F DIAST BP <80 MM HG: CPT | Performed by: INTERNAL MEDICINE

## 2023-05-22 PROCEDURE — G8399 PT W/DXA RESULTS DOCUMENT: HCPCS | Performed by: INTERNAL MEDICINE

## 2023-05-22 PROCEDURE — 3074F SYST BP LT 130 MM HG: CPT | Performed by: INTERNAL MEDICINE

## 2023-05-22 PROCEDURE — 3017F COLORECTAL CA SCREEN DOC REV: CPT | Performed by: INTERNAL MEDICINE

## 2023-05-22 PROCEDURE — 1036F TOBACCO NON-USER: CPT | Performed by: INTERNAL MEDICINE

## 2023-05-22 PROCEDURE — G8427 DOCREV CUR MEDS BY ELIG CLIN: HCPCS | Performed by: INTERNAL MEDICINE

## 2023-05-22 PROCEDURE — G8420 CALC BMI NORM PARAMETERS: HCPCS | Performed by: INTERNAL MEDICINE

## 2023-05-22 PROCEDURE — 1123F ACP DISCUSS/DSCN MKR DOCD: CPT | Performed by: INTERNAL MEDICINE

## 2023-05-22 RX ORDER — LEVOTHYROXINE SODIUM 0.1 MG/1
100 TABLET ORAL DAILY
Qty: 90 TABLET | Refills: 1 | Status: SHIPPED | OUTPATIENT
Start: 2023-05-22

## 2023-05-22 RX ORDER — ESCITALOPRAM OXALATE 5 MG/1
5 TABLET ORAL DAILY
Qty: 30 TABLET | Refills: 5 | Status: SHIPPED | OUTPATIENT
Start: 2023-05-22

## 2023-05-22 RX ORDER — AMOXICILLIN 875 MG/1
TABLET, COATED ORAL
COMMUNITY
Start: 2023-05-18

## 2023-05-22 RX ORDER — DULOXETIN HYDROCHLORIDE 20 MG/1
CAPSULE, DELAYED RELEASE ORAL
Qty: 90 CAPSULE | OUTPATIENT
Start: 2023-05-22

## 2023-05-22 SDOH — ECONOMIC STABILITY: HOUSING INSECURITY
IN THE LAST 12 MONTHS, WAS THERE A TIME WHEN YOU DID NOT HAVE A STEADY PLACE TO SLEEP OR SLEPT IN A SHELTER (INCLUDING NOW)?: NO

## 2023-05-22 SDOH — ECONOMIC STABILITY: FOOD INSECURITY: WITHIN THE PAST 12 MONTHS, THE FOOD YOU BOUGHT JUST DIDN'T LAST AND YOU DIDN'T HAVE MONEY TO GET MORE.: NEVER TRUE

## 2023-05-22 SDOH — ECONOMIC STABILITY: INCOME INSECURITY: HOW HARD IS IT FOR YOU TO PAY FOR THE VERY BASICS LIKE FOOD, HOUSING, MEDICAL CARE, AND HEATING?: NOT HARD AT ALL

## 2023-05-22 SDOH — ECONOMIC STABILITY: FOOD INSECURITY: WITHIN THE PAST 12 MONTHS, YOU WORRIED THAT YOUR FOOD WOULD RUN OUT BEFORE YOU GOT MONEY TO BUY MORE.: NEVER TRUE

## 2023-05-22 ASSESSMENT — PATIENT HEALTH QUESTIONNAIRE - PHQ9
SUM OF ALL RESPONSES TO PHQ QUESTIONS 1-9: 0
9. THOUGHTS THAT YOU WOULD BE BETTER OFF DEAD, OR OF HURTING YOURSELF: 0
SUM OF ALL RESPONSES TO PHQ9 QUESTIONS 1 & 2: 0
5. POOR APPETITE OR OVEREATING: 0
4. FEELING TIRED OR HAVING LITTLE ENERGY: 0
2. FEELING DOWN, DEPRESSED OR HOPELESS: 0
3. TROUBLE FALLING OR STAYING ASLEEP: 0
10. IF YOU CHECKED OFF ANY PROBLEMS, HOW DIFFICULT HAVE THESE PROBLEMS MADE IT FOR YOU TO DO YOUR WORK, TAKE CARE OF THINGS AT HOME, OR GET ALONG WITH OTHER PEOPLE: 0
SUM OF ALL RESPONSES TO PHQ QUESTIONS 1-9: 0
6. FEELING BAD ABOUT YOURSELF - OR THAT YOU ARE A FAILURE OR HAVE LET YOURSELF OR YOUR FAMILY DOWN: 0
SUM OF ALL RESPONSES TO PHQ QUESTIONS 1-9: 0
7. TROUBLE CONCENTRATING ON THINGS, SUCH AS READING THE NEWSPAPER OR WATCHING TELEVISION: 0
8. MOVING OR SPEAKING SO SLOWLY THAT OTHER PEOPLE COULD HAVE NOTICED. OR THE OPPOSITE, BEING SO FIGETY OR RESTLESS THAT YOU HAVE BEEN MOVING AROUND A LOT MORE THAN USUAL: 0
1. LITTLE INTEREST OR PLEASURE IN DOING THINGS: 0
SUM OF ALL RESPONSES TO PHQ QUESTIONS 1-9: 0

## 2023-05-22 ASSESSMENT — ENCOUNTER SYMPTOMS
SHORTNESS OF BREATH: 0
COUGH: 0

## 2023-05-22 NOTE — TELEPHONE ENCOUNTER
Patient was calling in with same symptoms as triage that was completed on 5/15.  ECC with follow recommendations and schedule sandy within 2 weeks of 5/15

## 2023-05-22 NOTE — PROGRESS NOTES
Patient is here for acid reflux. Chief Complaint   Patient presents with    Gastroesophageal Reflux          [unfilled]    Current Outpatient Medications on File Prior to Visit   Medication Sig Dispense Refill    carbidopa-levodopa (SINEMET)  MG per tablet TAKE 2 TAB BY MOUTH THREE TIMES A DAY FOLLOW DIRECTIONS GIVEN IN [facility]      vitamin D 25 MCG (1000 UT) CAPS Take 1 capsule by mouth daily      diclofenac sodium (VOLTAREN) 1 % GEL APPLY TO AFFECTED AREA 3 TIMES A DAY      donepezil (ARICEPT) 5 MG tablet Take 1 tablet by mouth daily      furosemide (LASIX) 20 MG tablet Take 1 tablet by mouth daily      levothyroxine (SYNTHROID) 100 MCG tablet TAKE 1 TABLET BY MOUTH EVERY DAY BEFORE BREAKFAST AND TAKE 1/2 TABS ON SUNDAYS.      rOPINIRole (REQUIP) 1 MG tablet TAKE 1 TABLET BY MOUTH EVERYDAY AT BEDTIME      amoxicillin (AMOXIL) 875 MG tablet       DULoxetine (CYMBALTA) 20 MG extended release capsule Take 20 mg by mouth daily       No current facility-administered medications on file prior to visit. Health Maintenance Due   Topic    DTaP/Tdap/Td vaccine (1 - Tdap)    Shingles vaccine (2 of 3)    Breast cancer screen     COVID-19 Vaccine (3 - Booster for Pfizer series)    Colorectal Cancer Screen        1. \"Have you been to the ER, urgent care clinic since your last visit? Hospitalized since your last visit? \" No    2. \"Have you seen or consulted any other health care providers outside of the [address] [place] [address] since your last visit? \" No    3. For patients aged 39-70: Has the patient had a colonoscopy / FIT/ Cologuard? No      If the patient is female:    4. For patients aged 41-77: Has the patient had a mammogram within the past 2 years? No      5. For patients aged 21-65: Has the patient had a pap smear?  No

## 2023-05-22 NOTE — PROGRESS NOTES
Remi Carranza is a 75 y.o. female  Chief Complaint   Patient presents with    Gastroesophageal Reflux       Vitals:    05/22/23 1403   BP: 114/65   Pulse: 71   Resp: 20   Temp: (!) 96.5 °F (35.8 °C)   SpO2: 98%          HPI  Ms. Remi Carranza is a 50-year-old female who presents with her POA to discuss some health concerns. Patient has a history of progressive Parkinson disease and reports that over the last couple of months, she was having more difficulty with memory, hallucinations intermittently and nightmares. He is also physically depressed and that can be related to depression that she is having worsening of the symptoms. She is on duloxetine and also asks about anything her symptoms are related to the medication as she feels groggy after taking the medication. I will switch to duloxetine to another SSRI, small dose of duloxetine. Past Medical History:   Diagnosis Date    Fibrocystic breast disease     Hypertension     Hypothyroid 2/1998    s/p I131 treatment - [facility]    IBS (irritable bowel syndrome)     MADISYN on CPAP     4mm    Parkinson's disease ([facility] [address]     Pilonidal cyst 1968        ROS  Review of Systems   Constitutional:  Negative for fever. Respiratory:  Negative for cough and shortness of breath. Cardiovascular:  Negative for chest pain and palpitations. Neurological:  Negative for headaches. Psychiatric/Behavioral:  Negative for dysphoric mood. EXAM  Physical Exam  Vitals reviewed. Constitutional:       Appearance: Normal appearance. Cardiovascular:      Pulses: Normal pulses. Heart sounds: Normal heart sounds. No murmur heard. Pulmonary:      Effort: Pulmonary effort is normal.      Breath sounds: Normal breath sounds. Neurological:      General: No focal deficit present. Mental Status: She is alert. Psychiatric:         Attention and Perception: Attention normal.         Mood and Affect: Mood is depressed.        Health Maintenance Due   Topic Date Due

## 2023-05-23 LAB
ALBUMIN SERPL-MCNC: 4 G/DL (ref 3.5–5)
ALBUMIN/GLOB SERPL: 1.1 (ref 1.1–2.2)
ALP SERPL-CCNC: 114 U/L (ref 45–117)
ALT SERPL-CCNC: 15 U/L (ref 12–78)
ANION GAP SERPL CALC-SCNC: 2 MMOL/L (ref 5–15)
APPEARANCE UR: ABNORMAL
AST SERPL-CCNC: 27 U/L (ref 15–37)
BACTERIA URNS QL MICRO: NEGATIVE /HPF
BILIRUB SERPL-MCNC: 0.7 MG/DL (ref 0.2–1)
BILIRUB UR QL: NEGATIVE
BUN SERPL-MCNC: 27 MG/DL (ref 6–20)
BUN/CREAT SERPL: 24 (ref 12–20)
CALCIUM SERPL-MCNC: 10.9 MG/DL (ref 8.5–10.1)
CHLORIDE SERPL-SCNC: 100 MMOL/L (ref 97–108)
CO2 SERPL-SCNC: 35 MMOL/L (ref 21–32)
COLOR UR: ABNORMAL
CREAT SERPL-MCNC: 1.11 MG/DL (ref 0.55–1.02)
EPITH CASTS URNS QL MICRO: ABNORMAL /LPF
GLOBULIN SER CALC-MCNC: 3.5 G/DL (ref 2–4)
GLUCOSE SERPL-MCNC: 93 MG/DL (ref 65–100)
GLUCOSE UR STRIP.AUTO-MCNC: NEGATIVE MG/DL
HGB UR QL STRIP: NEGATIVE
HYALINE CASTS URNS QL MICRO: ABNORMAL /LPF (ref 0–5)
KETONES UR QL STRIP.AUTO: ABNORMAL MG/DL
LEUKOCYTE ESTERASE UR QL STRIP.AUTO: NEGATIVE
NITRITE UR QL STRIP.AUTO: NEGATIVE
PH UR STRIP: 5.5 (ref 5–8)
POTASSIUM SERPL-SCNC: 4.1 MMOL/L (ref 3.5–5.1)
PROT SERPL-MCNC: 7.5 G/DL (ref 6.4–8.2)
PROT UR STRIP-MCNC: NEGATIVE MG/DL
RBC #/AREA URNS HPF: ABNORMAL /HPF (ref 0–5)
SODIUM SERPL-SCNC: 137 MMOL/L (ref 136–145)
SP GR UR REFRACTOMETRY: 1.02 (ref 1–1.03)
UROBILINOGEN UR QL STRIP.AUTO: 0.2 EU/DL (ref 0.2–1)
WBC URNS QL MICRO: ABNORMAL /HPF (ref 0–4)

## 2023-05-25 LAB — TSH SERPL DL<=0.05 MIU/L-ACNC: 0.85 UIU/ML (ref 0.45–4.5)

## 2023-05-26 ENCOUNTER — TELEPHONE (OUTPATIENT)
Age: 73
End: 2023-05-26

## 2023-05-26 NOTE — TELEPHONE ENCOUNTER
----- Message from Gracie Esteban MD sent at 5/26/2023  1:25 PM EDT -----  Results are not significant. Your urine didn't show infection, your thyroid level is normal, your other blood work show slight signs of dehydration, eat and drink enough fluids. And keep your appointment in July to see how you are doing.

## 2023-06-21 RX ORDER — ESCITALOPRAM OXALATE 5 MG/1
5 TABLET ORAL DAILY
Qty: 90 TABLET | Refills: 1 | Status: SHIPPED | OUTPATIENT
Start: 2023-06-21

## 2023-07-31 ENCOUNTER — NURSE TRIAGE (OUTPATIENT)
Dept: OTHER | Facility: CLINIC | Age: 73
End: 2023-07-31

## 2023-07-31 NOTE — TELEPHONE ENCOUNTER
Received call from Bethany at [facility], caller not on line. Complaint: parkinsons, eclining health     Practice Name: [employer]     Caller's telephone number verified as     Connected with caller via phone, please see below triage       Location of patient: [place]     Received call from Bethany at [facility] with [employer] Complaint. Subjective: Caller states \"weak and fatigue not eating well\"     Current Symptoms: poa calling with concerns  weak and progressing parkinsons and  ill and getting ready to go into hospice care out of the home so will be by herself , trouble getting out of bed c/o pain in her legs , tremors getting worse all over her body    Limited triage as caller not with the pt     Onset: gradually getting worse over the past week     Associated Symptoms: NA    Pain Severity: in legs     Temperature: none       What has been tried:     LMP: NA Pregnant: NA    Recommended disposition: See in Office Today    Care advice provided, patient verbalizes understanding; denies any other questions or concerns; instructed to call back for any new or worsening symptoms. Patient/Caller agrees with recommended disposition; writer provided warm transfer to message sent  at [facility] for appointment scheduling    Attention Provider: Thank you for allowing me to participate in the care of your patient. The patient was connected to triage in response to information provided to the ECC/PSC. Please do not respond through this encounter as the response is not directed to a shared pool.          Reason for Disposition   MODERATE weakness (i.e., interferes with work, school, normal activities) and cause unknown  (Exceptions: Weakness with acute minor illness, or weakness from poor fluid intake.)    Protocols used: Weakness (Generalized) and Fatigue-ADULT-[place]

## 2023-08-03 ENCOUNTER — TELEPHONE (OUTPATIENT)
Age: 73
End: 2023-08-03

## 2023-08-03 DIAGNOSIS — R13.10 DYSPHAGIA, UNSPECIFIED TYPE: Primary | ICD-10-CM

## 2023-08-03 DIAGNOSIS — G20 PARKINSON'S DISEASE (HCC): ICD-10-CM

## 2023-08-03 NOTE — TELEPHONE ENCOUNTER
[facility] speech therapist would like a order to be put in for Modified Barium Swallow Study.    Her contact # [phone]

## 2023-08-04 NOTE — TELEPHONE ENCOUNTER
Please let patient and speech therapist - see below - know that Mod Barium Swallow has been ordered.   [facility] should call to schedule by Wednesday

## 2023-08-17 ENCOUNTER — OFFICE VISIT (OUTPATIENT)
Age: 73
End: 2023-08-17
Payer: MEDICARE

## 2023-08-17 VITALS
OXYGEN SATURATION: 95 % | HEIGHT: 65 IN | WEIGHT: 125 LBS | DIASTOLIC BLOOD PRESSURE: 81 MMHG | HEART RATE: 54 BPM | RESPIRATION RATE: 18 BRPM | BODY MASS INDEX: 20.83 KG/M2 | SYSTOLIC BLOOD PRESSURE: 151 MMHG | TEMPERATURE: 97.4 F

## 2023-08-17 DIAGNOSIS — F41.9 ANXIETY AND DEPRESSION: ICD-10-CM

## 2023-08-17 DIAGNOSIS — G25.81 RLS (RESTLESS LEGS SYNDROME): ICD-10-CM

## 2023-08-17 DIAGNOSIS — R13.12 OROPHARYNGEAL DYSPHAGIA: ICD-10-CM

## 2023-08-17 DIAGNOSIS — Z12.31 ENCOUNTER FOR SCREENING MAMMOGRAM FOR MALIGNANT NEOPLASM OF BREAST: ICD-10-CM

## 2023-08-17 DIAGNOSIS — Z12.11 SCREEN FOR COLON CANCER: ICD-10-CM

## 2023-08-17 DIAGNOSIS — F32.A ANXIETY AND DEPRESSION: ICD-10-CM

## 2023-08-17 DIAGNOSIS — E03.9 ACQUIRED HYPOTHYROIDISM: ICD-10-CM

## 2023-08-17 DIAGNOSIS — G20 PARKINSON'S DISEASE (HCC): Primary | ICD-10-CM

## 2023-08-17 DIAGNOSIS — Z91.81 AT HIGH RISK FOR FALLS: ICD-10-CM

## 2023-08-17 PROCEDURE — 99214 OFFICE O/P EST MOD 30 MIN: CPT | Performed by: INTERNAL MEDICINE

## 2023-08-17 NOTE — PATIENT INSTRUCTIONS
Appointments to make:  Dr. Elvie Liu (neurologist)  Eye doctor    Call [id number] to schedule  Swallowing study  Mammogram    New Cologuard will be mailed to your house    We will contact [facility] health about setting up home PT and OT    Look into [facility] [employer].

## 2023-08-18 RX ORDER — ROPINIROLE 1 MG/1
1 TABLET, FILM COATED ORAL
Qty: 90 TABLET | Refills: 1 | Status: SHIPPED | OUTPATIENT
Start: 2023-08-18

## 2023-08-24 ENCOUNTER — TELEPHONE (OUTPATIENT)
Age: 73
End: 2023-08-24

## 2023-08-24 NOTE — TELEPHONE ENCOUNTER
[facility] pt would like to discontinue services due to the passing of her  and her moving to [facility] living. Just wanted to inform of the change.

## 2023-08-30 RX ORDER — LOSARTAN POTASSIUM AND HYDROCHLOROTHIAZIDE 12.5; 5 MG/1; MG/1
TABLET ORAL
Qty: 90 TABLET | Refills: 3 | Status: SHIPPED | OUTPATIENT
Start: 2023-08-30

## 2023-09-29 ENCOUNTER — TELEPHONE (OUTPATIENT)
Dept: PHARMACY | Facility: CLINIC | Age: 73
End: 2023-09-29

## 2023-09-29 NOTE — TELEPHONE ENCOUNTER
[facility] CLINICAL PHARMACY: ADHERENCE REVIEW  Identified care gap per [employer] [facility] adherence    Last Visit: 2023  Next Visit: 10/10/2023    Patient also appears to be prescribed:No Others in the metric at this time  Medicare Advantage - [address] [place] [address] Records claims through 10/3/ 2023(Prior Year [address] = not reported; YTD [address] = 75%; Potential Fail Date: 10/8/2023):   Losartan/hctz 50-12.5 mg last filled on  for  qty 90 / 90 day supply. Next refill due: 2023 -PAST DUE 40 DAYS   Per [facility]:   will get 90 day supply ready to  since past due. A new rx was sent 23 90 + 3 ref  BP Readings from Last 3 Encounters:   23 (!) 151/81   23 114/65   23 133/85     Estimated Creatinine Clearance: 40 mL/min (A) (based on SCr of 1.11 mg/dL (H)). Lab Results   Component Value Date    CREATININE 1.11 (H) 2023     Lab Results   Component Value Date    K 4.1 2023       PLAN  The following are interventions that have been identified:   Patient overdue refilling Losartan/hctz 20-12.5mg and active on home medication list.   Outreach:  Pharmacy:  [address] [place] [address] will fill 90 day supply of 90 today 10/3/2023 . Patient:  Letter sent to patient. Qian Xiaoâ€™erhart message sent to patient. Attempting to reach patient to review. Left message asking for return call.   Mailbox full    Carine Beach, PharmD, [facility], toll free: [phone] Option 2    For Pharmacy Admin Tracking Only    Program: Yordy in place:  No  Recommendation Provided To: Pharmacy: 1  Intervention Detail: Adherence Monitorin  Intervention Accepted By: Pharmacy: 1  Gap Closed?: No   Time Spent (min): 15

## 2023-10-10 ENCOUNTER — OFFICE VISIT (OUTPATIENT)
Age: 73
End: 2023-10-10
Payer: MEDICARE

## 2023-10-10 VITALS
DIASTOLIC BLOOD PRESSURE: 77 MMHG | HEART RATE: 61 BPM | BODY MASS INDEX: 21.89 KG/M2 | TEMPERATURE: 98 F | WEIGHT: 131.4 LBS | RESPIRATION RATE: 16 BRPM | HEIGHT: 65 IN | SYSTOLIC BLOOD PRESSURE: 118 MMHG | OXYGEN SATURATION: 98 %

## 2023-10-10 DIAGNOSIS — E55.9 VITAMIN D DEFICIENCY: ICD-10-CM

## 2023-10-10 DIAGNOSIS — G20.B1 PARKINSON'S DISEASE WITH DYSKINESIA WITHOUT FLUCTUATING MANIFESTATIONS: ICD-10-CM

## 2023-10-10 DIAGNOSIS — G25.81 RLS (RESTLESS LEGS SYNDROME): ICD-10-CM

## 2023-10-10 DIAGNOSIS — F32.A ANXIETY AND DEPRESSION: ICD-10-CM

## 2023-10-10 DIAGNOSIS — Z23 NEED FOR PROPHYLACTIC VACCINATION AGAINST DIPHTHERIA-TETANUS-PERTUSSIS (DTP): ICD-10-CM

## 2023-10-10 DIAGNOSIS — E03.9 ACQUIRED HYPOTHYROIDISM: ICD-10-CM

## 2023-10-10 DIAGNOSIS — I10 ESSENTIAL (PRIMARY) HYPERTENSION: ICD-10-CM

## 2023-10-10 DIAGNOSIS — Z23 NEEDS FLU SHOT: ICD-10-CM

## 2023-10-10 DIAGNOSIS — Z23 NEED FOR PROPHYLACTIC VACCINATION AND INOCULATION AGAINST VARICELLA: ICD-10-CM

## 2023-10-10 DIAGNOSIS — F41.9 ANXIETY AND DEPRESSION: ICD-10-CM

## 2023-10-10 DIAGNOSIS — Z00.00 MEDICARE ANNUAL WELLNESS VISIT, SUBSEQUENT: Primary | ICD-10-CM

## 2023-10-10 LAB
25(OH)D3 SERPL-MCNC: 21.7 NG/ML (ref 30–100)
ALBUMIN SERPL-MCNC: 3.7 G/DL (ref 3.5–5)
ALBUMIN/GLOB SERPL: 1.1 (ref 1.1–2.2)
ALP SERPL-CCNC: 110 U/L (ref 45–117)
ALT SERPL-CCNC: 12 U/L (ref 12–78)
ANION GAP SERPL CALC-SCNC: 3 MMOL/L (ref 5–15)
AST SERPL-CCNC: 17 U/L (ref 15–37)
BILIRUB SERPL-MCNC: 0.6 MG/DL (ref 0.2–1)
BUN SERPL-MCNC: 25 MG/DL (ref 6–20)
BUN/CREAT SERPL: 23 (ref 12–20)
CALCIUM SERPL-MCNC: 10.4 MG/DL (ref 8.5–10.1)
CHLORIDE SERPL-SCNC: 105 MMOL/L (ref 97–108)
CO2 SERPL-SCNC: 33 MMOL/L (ref 21–32)
CREAT SERPL-MCNC: 1.08 MG/DL (ref 0.55–1.02)
GLOBULIN SER CALC-MCNC: 3.3 G/DL (ref 2–4)
GLUCOSE SERPL-MCNC: 78 MG/DL (ref 65–100)
POTASSIUM SERPL-SCNC: 4.5 MMOL/L (ref 3.5–5.1)
PROT SERPL-MCNC: 7 G/DL (ref 6.4–8.2)
SODIUM SERPL-SCNC: 141 MMOL/L (ref 136–145)
T4 FREE SERPL-MCNC: 1.3 NG/DL (ref 0.8–1.5)
TSH SERPL DL<=0.05 MIU/L-ACNC: 0.56 UIU/ML (ref 0.36–3.74)

## 2023-10-10 PROCEDURE — 90694 VACC AIIV4 NO PRSRV 0.5ML IM: CPT | Performed by: INTERNAL MEDICINE

## 2023-10-10 PROCEDURE — 3074F SYST BP LT 130 MM HG: CPT | Performed by: INTERNAL MEDICINE

## 2023-10-10 PROCEDURE — 1123F ACP DISCUSS/DSCN MKR DOCD: CPT | Performed by: INTERNAL MEDICINE

## 2023-10-10 PROCEDURE — 3078F DIAST BP <80 MM HG: CPT | Performed by: INTERNAL MEDICINE

## 2023-10-10 PROCEDURE — G0439 PPPS, SUBSEQ VISIT: HCPCS | Performed by: INTERNAL MEDICINE

## 2023-10-10 PROCEDURE — PBSHW INFLUENZA, FLUAD, (AGE 65 Y+), IM, PF, 0.5 ML: Performed by: INTERNAL MEDICINE

## 2023-10-10 RX ORDER — ESCITALOPRAM OXALATE 10 MG/1
10 TABLET ORAL DAILY
Qty: 90 TABLET | Refills: 1 | Status: SHIPPED | OUTPATIENT
Start: 2023-10-10

## 2023-10-10 RX ORDER — ROPINIROLE 1 MG/1
1 TABLET, FILM COATED ORAL
Qty: 90 TABLET | Refills: 1 | Status: SHIPPED | OUTPATIENT
Start: 2023-10-10

## 2023-10-10 SDOH — ECONOMIC STABILITY: FOOD INSECURITY: WITHIN THE PAST 12 MONTHS, YOU WORRIED THAT YOUR FOOD WOULD RUN OUT BEFORE YOU GOT MONEY TO BUY MORE.: NEVER TRUE

## 2023-10-10 SDOH — ECONOMIC STABILITY: INCOME INSECURITY: HOW HARD IS IT FOR YOU TO PAY FOR THE VERY BASICS LIKE FOOD, HOUSING, MEDICAL CARE, AND HEATING?: NOT HARD AT ALL

## 2023-10-10 SDOH — ECONOMIC STABILITY: FOOD INSECURITY: WITHIN THE PAST 12 MONTHS, THE FOOD YOU BOUGHT JUST DIDN'T LAST AND YOU DIDN'T HAVE MONEY TO GET MORE.: NEVER TRUE

## 2023-10-10 ASSESSMENT — PATIENT HEALTH QUESTIONNAIRE - PHQ9
SUM OF ALL RESPONSES TO PHQ9 QUESTIONS 1 & 2: 1
1. LITTLE INTEREST OR PLEASURE IN DOING THINGS: 0
3. TROUBLE FALLING OR STAYING ASLEEP: 2
9. THOUGHTS THAT YOU WOULD BE BETTER OFF DEAD, OR OF HURTING YOURSELF: 0
1. LITTLE INTEREST OR PLEASURE IN DOING THINGS: 0
SUM OF ALL RESPONSES TO PHQ QUESTIONS 1-9: 7
7. TROUBLE CONCENTRATING ON THINGS, SUCH AS READING THE NEWSPAPER OR WATCHING TELEVISION: 2
10. IF YOU CHECKED OFF ANY PROBLEMS, HOW DIFFICULT HAVE THESE PROBLEMS MADE IT FOR YOU TO DO YOUR WORK, TAKE CARE OF THINGS AT HOME, OR GET ALONG WITH OTHER PEOPLE: 1
SUM OF ALL RESPONSES TO PHQ QUESTIONS 1-9: 1
6. FEELING BAD ABOUT YOURSELF - OR THAT YOU ARE A FAILURE OR HAVE LET YOURSELF OR YOUR FAMILY DOWN: 1
8. MOVING OR SPEAKING SO SLOWLY THAT OTHER PEOPLE COULD HAVE NOTICED. OR THE OPPOSITE, BEING SO FIGETY OR RESTLESS THAT YOU HAVE BEEN MOVING AROUND A LOT MORE THAN USUAL: 0
SUM OF ALL RESPONSES TO PHQ QUESTIONS 1-9: 1
2. FEELING DOWN, DEPRESSED OR HOPELESS: 1
SUM OF ALL RESPONSES TO PHQ QUESTIONS 1-9: 1
SUM OF ALL RESPONSES TO PHQ QUESTIONS 1-9: 7
SUM OF ALL RESPONSES TO PHQ QUESTIONS 1-9: 7
4. FEELING TIRED OR HAVING LITTLE ENERGY: 2
5. POOR APPETITE OR OVEREATING: 0
SUM OF ALL RESPONSES TO PHQ QUESTIONS 1-9: 1
SUM OF ALL RESPONSES TO PHQ QUESTIONS 1-9: 7

## 2023-10-10 ASSESSMENT — ENCOUNTER SYMPTOMS
SINUS PRESSURE: 0
SORE THROAT: 0
SHORTNESS OF BREATH: 0
WHEEZING: 0
ABDOMINAL DISTENTION: 0
ALLERGIC/IMMUNOLOGIC NEGATIVE: 1
CHOKING: 0
TROUBLE SWALLOWING: 0
EYE DISCHARGE: 0
VOICE CHANGE: 0
APNEA: 0
CHEST TIGHTNESS: 0
ABDOMINAL PAIN: 0
NAUSEA: 0
RECTAL PAIN: 0
COUGH: 1
PHOTOPHOBIA: 0
BLOOD IN STOOL: 0
RHINORRHEA: 0
STRIDOR: 0
ROS SKIN COMMENTS: SEBORRHEIC KERATOSIS
FACIAL SWELLING: 0
EYE PAIN: 0
SINUS PAIN: 0
EYE REDNESS: 1
DIARRHEA: 0
VOMITING: 0
BACK PAIN: 0
CONSTIPATION: 0
EYE ITCHING: 1
ANAL BLEEDING: 0

## 2023-10-10 ASSESSMENT — LIFESTYLE VARIABLES
HOW OFTEN DO YOU HAVE A DRINK CONTAINING ALCOHOL: NEVER
HOW MANY STANDARD DRINKS CONTAINING ALCOHOL DO YOU HAVE ON A TYPICAL DAY: PATIENT DOES NOT DRINK

## 2024-01-10 ENCOUNTER — TELEPHONE (OUTPATIENT)
Age: 74
End: 2024-01-10

## 2024-01-10 NOTE — TELEPHONE ENCOUNTER
Spoke with patient to schedule mammogram ordered by Dr. Quintero on 8/17/23. Pt declined at this time.

## 2024-08-24 ENCOUNTER — APPOINTMENT (OUTPATIENT)
Facility: HOSPITAL | Age: 74
End: 2024-08-24
Payer: MEDICARE

## 2024-08-24 ENCOUNTER — HOSPITAL ENCOUNTER (EMERGENCY)
Facility: HOSPITAL | Age: 74
Discharge: HOME | End: 2024-08-24
Attending: EMERGENCY MEDICINE
Payer: MEDICARE

## 2024-08-24 VITALS
TEMPERATURE: 98.5 F | RESPIRATION RATE: 14 BRPM | WEIGHT: 140.65 LBS | SYSTOLIC BLOOD PRESSURE: 163 MMHG | DIASTOLIC BLOOD PRESSURE: 86 MMHG | OXYGEN SATURATION: 96 % | BODY MASS INDEX: 22.08 KG/M2 | HEIGHT: 67 IN | HEART RATE: 59 BPM

## 2024-08-24 DIAGNOSIS — S09.90XA INJURY OF HEAD, INITIAL ENCOUNTER: ICD-10-CM
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
## 2024-08-24 DIAGNOSIS — S70.01XA CONTUSION OF RIGHT HIP, INITIAL ENCOUNTER: ICD-10-CM

## 2024-08-24 DIAGNOSIS — S16.1XXA STRAIN OF NECK MUSCLE, INITIAL ENCOUNTER: ICD-10-CM

## 2024-08-24 LAB
ALBUMIN SERPL-MCNC: 3.9 G/DL (ref 3.5–5)
ALBUMIN/GLOB SERPL: 1.1 (ref 1.1–2.2)
ALP SERPL-CCNC: 94 U/L (ref 45–117)
ALT SERPL-CCNC: 11 U/L (ref 12–78)
ANION GAP SERPL CALC-SCNC: 5 MMOL/L (ref 5–15)
AST SERPL-CCNC: 15 U/L (ref 15–37)
BASOPHILS # BLD: 0.1 K/UL (ref 0–0.1)
BASOPHILS NFR BLD: 1 % (ref 0–1)
BILIRUB SERPL-MCNC: 0.9 MG/DL (ref 0.2–1)
BUN SERPL-MCNC: 18 MG/DL (ref 6–20)
BUN/CREAT SERPL: 14 (ref 12–20)
CALCIUM SERPL-MCNC: 10.4 MG/DL (ref 8.5–10.1)
CHLORIDE SERPL-SCNC: 106 MMOL/L (ref 97–108)
CO2 SERPL-SCNC: 29 MMOL/L (ref 21–32)
COMMENT:: NORMAL
CREAT SERPL-MCNC: 1.26 MG/DL (ref 0.55–1.02)
DIFFERENTIAL METHOD BLD: NORMAL
EKG ATRIAL RATE: 58 BPM
EKG DIAGNOSIS: NORMAL
EKG P AXIS: 41 DEGREES
EKG P-R INTERVAL: 154 MS
EKG Q-T INTERVAL: 396 MS
EKG QRS DURATION: 88 MS
EKG QTC CALCULATION (BAZETT): 388 MS
EKG R AXIS: 41 DEGREES
EKG T AXIS: 57 DEGREES
EKG VENTRICULAR RATE: 58 BPM
EOSINOPHIL # BLD: 0.2 K/UL (ref 0–0.4)
EOSINOPHIL NFR BLD: 3 % (ref 0–7)
ERYTHROCYTE [DISTWIDTH] IN BLOOD BY AUTOMATED COUNT: 13.5 % (ref 11.5–14.5)
GLOBULIN SER CALC-MCNC: 3.4 G/DL (ref 2–4)
GLUCOSE SERPL-MCNC: 96 MG/DL (ref 65–100)
HCT VFR BLD AUTO: 38.5 % (ref 35–47)
HGB BLD-MCNC: 12.4 G/DL (ref 11.5–16)
IMM GRANULOCYTES # BLD AUTO: 0 K/UL (ref 0–0.04)
IMM GRANULOCYTES NFR BLD AUTO: 0 % (ref 0–0.5)
LYMPHOCYTES # BLD: 1.5 K/UL (ref 0.8–3.5)
LYMPHOCYTES NFR BLD: 22 % (ref 12–49)
MCH RBC QN AUTO: 30.6 PG (ref 26–34)
MCHC RBC AUTO-ENTMCNC: 32.2 G/DL (ref 30–36.5)
MCV RBC AUTO: 95.1 FL (ref 80–99)
MONOCYTES # BLD: 0.6 K/UL (ref 0–1)
MONOCYTES NFR BLD: 8 % (ref 5–13)
NEUTS SEG # BLD: 4.5 K/UL (ref 1.8–8)
NEUTS SEG NFR BLD: 66 % (ref 32–75)
NRBC # BLD: 0 K/UL (ref 0–0.01)
NRBC BLD-RTO: 0 PER 100 WBC
PLATELET # BLD AUTO: 219 K/UL (ref 150–400)
PMV BLD AUTO: 10.7 FL (ref 8.9–12.9)
POTASSIUM SERPL-SCNC: 4.4 MMOL/L (ref 3.5–5.1)
PROT SERPL-MCNC: 7.3 G/DL (ref 6.4–8.2)
RBC # BLD AUTO: 4.05 M/UL (ref 3.8–5.2)
SODIUM SERPL-SCNC: 140 MMOL/L (ref 136–145)
SPECIMEN HOLD: NORMAL
TROPONIN I SERPL HS-MCNC: 4 NG/L (ref 0–51)
WBC # BLD AUTO: 6.8 K/UL (ref 3.6–11)

## 2024-08-24 PROCEDURE — 93005 ELECTROCARDIOGRAM TRACING: CPT | Performed by: EMERGENCY MEDICINE

## 2024-08-24 PROCEDURE — 85025 COMPLETE CBC W/AUTO DIFF WBC: CPT

## 2024-08-24 PROCEDURE — 70450 CT HEAD/BRAIN W/O DYE: CPT

## 2024-08-24 PROCEDURE — 73502 X-RAY EXAM HIP UNI 2-3 VIEWS: CPT

## 2024-08-24 PROCEDURE — 72125 CT NECK SPINE W/O DYE: CPT

## 2024-08-24 PROCEDURE — 36415 COLL VENOUS BLD VENIPUNCTURE: CPT

## 2024-08-24 PROCEDURE — 80053 COMPREHEN METABOLIC PANEL: CPT

## 2024-08-24 PROCEDURE — 84484 ASSAY OF TROPONIN QUANT: CPT

## 2024-08-24 PROCEDURE — 99285 EMERGENCY DEPT VISIT HI MDM: CPT

## 2024-08-24 ASSESSMENT — PAIN DESCRIPTION - ONSET: ONSET: ON-GOING

## 2024-08-24 ASSESSMENT — PAIN - FUNCTIONAL ASSESSMENT
PAIN_FUNCTIONAL_ASSESSMENT: 0-10
PAIN_FUNCTIONAL_ASSESSMENT: PREVENTS OR INTERFERES SOME ACTIVE ACTIVITIES AND ADLS

## 2024-08-24 ASSESSMENT — PAIN DESCRIPTION - LOCATION: LOCATION: HEAD;LEG;HIP

## 2024-08-24 ASSESSMENT — PAIN DESCRIPTION - PAIN TYPE: TYPE: ACUTE PAIN

## 2024-08-24 ASSESSMENT — PAIN SCALES - GENERAL: PAINLEVEL_OUTOF10: 7

## 2024-08-24 ASSESSMENT — PAIN DESCRIPTION - DESCRIPTORS: DESCRIPTORS: DISCOMFORT

## 2024-08-24 ASSESSMENT — PAIN DESCRIPTION - ORIENTATION: ORIENTATION: LEFT;RIGHT

## 2024-08-24 ASSESSMENT — PAIN DESCRIPTION - FREQUENCY: FREQUENCY: CONTINUOUS

## 2024-08-24 NOTE — ED PROVIDER NOTES
[facility] EMERGENCY DEP  EMERGENCY DEPARTMENT ENCOUNTER      Pt Name: Barbara Hughes  MRN: [id number]  Birthdate 1950  Date of evaluation: 8/24/2024  Provider: Mckinley Sheth MD    CHIEF COMPLAINT       Chief Complaint   Patient presents with    Fall         HISTORY OF PRESENT ILLNESS   (Location/Symptom, Timing/Onset, Context/Setting, Quality, Duration, Modifying Factors, Severity)  Note limiting factors.   74-year-old with a history of hypertension, Parkinson's disease, anxiety, hypothyroidism, IBS, sleep apnea, restless leg syndrome.  She presents via EMS from a nursing facility after a ground-level fall.  She is a limited historian.  She believes she fell while getting up to go to the bathroom this morning.  She believes that she may have hit her head.  She denies any significant headache.  She has a c-collar in place.  She states that she gets neck pain from time to time.  She has noted some right hip pain since the fall.  She states that she is unsteady on her feet and is prone to falling.  She denies any cough, congestion, vomiting, diarrhea.  She is unsure if she is having any urinary symptoms.          Review of External Medical Records:     Nursing Notes were reviewed.    REVIEW OF SYSTEMS    (2-9 systems for level 4, 10 or more for level 5)     Review of Systems    Except as noted above the remainder of the review of systems was reviewed and negative.       PAST MEDICAL HISTORY     Past Medical History:   Diagnosis Date    Anxiety     Fibrocystic breast disease     Hypertension     Hypothyroid 2/1998    s/p I131 treatment - [place]    IBS (irritable bowel syndrome)     MADISYN on CPAP     4mm    Parkinson's disease     Pilonidal cyst 1968    Restless legs syndrome     Urinary incontinence          SURGICAL HISTORY       Past Surgical History:   Procedure Laterality Date    APPENDECTOMY  1960    BREAST SURGERY  10/09    benign lumpectomy - left    CYST REMOVAL      DILATION AND CURETTAGE OF UTERUS  11/00     note that portions of this note were completed with a voice recognition program.  Efforts were made to edit the dictations but occasionally words are mis-transcribed.)    Mckinley Sheth MD (electronically signed)  Emergency Attending Physician / Physician Assistant / Nurse Practitioner             Mckinley Sheth MD  08/24/24 0353

## 2024-08-24 NOTE — ED TRIAGE NOTES
Patient from [facility] arrive by EMS with cc GLF at 7 am. Patient states she \"think hit head\". EMS state patient has right hip pain and left shin pain. Per EMS, BG 95.    Hx parkinson.

## 2024-08-24 NOTE — ED NOTES
[facility] arrived at 1345 to take patient back home.    RN called [facility] dispatch to cancel.    Patient has left the ER with all her belongings.  No acute distress noted.      Karon Appiah called and updated on patient status and return time.